# Patient Record
Sex: MALE | Race: WHITE | NOT HISPANIC OR LATINO | Employment: UNEMPLOYED | URBAN - METROPOLITAN AREA
[De-identification: names, ages, dates, MRNs, and addresses within clinical notes are randomized per-mention and may not be internally consistent; named-entity substitution may affect disease eponyms.]

---

## 2021-10-19 DIAGNOSIS — Q20.3 TRANSPOSITION GREAT ARTERIES: ICD-10-CM

## 2021-10-19 DIAGNOSIS — Q20.3 TRANSPOSITION OF THE GREAT ARTERIES: Primary | ICD-10-CM

## 2021-12-08 ENCOUNTER — HOSPITAL ENCOUNTER (OUTPATIENT)
Dept: CARDIOLOGY | Facility: CLINIC | Age: 41
Discharge: HOME | End: 2021-12-08
Payer: COMMERCIAL

## 2021-12-08 ENCOUNTER — HOSPITAL ENCOUNTER (OUTPATIENT)
Dept: CARDIOLOGY | Facility: HOSPITAL | Age: 41
Discharge: HOME | End: 2021-12-08
Attending: PEDIATRICS
Payer: COMMERCIAL

## 2021-12-08 ENCOUNTER — OFFICE VISIT (OUTPATIENT)
Dept: CARDIOLOGY | Facility: CLINIC | Age: 41
End: 2021-12-08
Payer: COMMERCIAL

## 2021-12-08 VITALS
OXYGEN SATURATION: 97 % | BODY MASS INDEX: 27.42 KG/M2 | WEIGHT: 191.56 LBS | HEIGHT: 69 IN | SYSTOLIC BLOOD PRESSURE: 134 MMHG | BODY MASS INDEX: 26.66 KG/M2 | HEIGHT: 70 IN | DIASTOLIC BLOOD PRESSURE: 78 MMHG | WEIGHT: 180 LBS | HEART RATE: 52 BPM

## 2021-12-08 DIAGNOSIS — Q20.3 TRANSPOSITION OF THE GREAT ARTERIES: ICD-10-CM

## 2021-12-08 DIAGNOSIS — Z87.74 H/O MUSTARD PROCEDURE: Primary | ICD-10-CM

## 2021-12-08 DIAGNOSIS — Z98.890 S/P MUSTARD OPERATION: ICD-10-CM

## 2021-12-08 DIAGNOSIS — Q24.9 ADULT CONGENITAL HEART DISEASE: ICD-10-CM

## 2021-12-08 DIAGNOSIS — Q20.3 D-TGA (DEXTRO-TRANSPOSITION OF GREAT ARTERIES): ICD-10-CM

## 2021-12-08 DIAGNOSIS — Q20.3 TRANSPOSITION GREAT ARTERIES: ICD-10-CM

## 2021-12-08 LAB
AV INDEX (PROSTH): 0.68
AV MEAN GRADIENT: 8 MMHG
AV PEAK GRADIENT: 12 MMHG
AV VELOCITY RATIO: 0.53
BSA FOR ECHO PROCEDURE: 1.99 M2
CV ECHO LV RWT: 0.45 CM
DOP CALC AO PEAK VEL: 1.71 M/S
DOP CALC AO VTI: 27.67 CM
DOP CALC LVOT PEAK VEL: 0.91 M/S
DOP CALC RVOT PEAK VEL: 1.28 M/S
DOP CALC RVOT VTI: 27.72 CM
DOP CALCLVOT PEAK VEL VTI: 18.93 CM
ECHO LV POSTERIOR WALL: 0.82 CM (ref 0.6–1.1)
FRACTIONAL SHORTENING: 41 % (ref 28–44)
INTERVENTRICULAR SEPTUM: 0.58 CM (ref 0.6–1.1)
LEFT ATRIUM SIZE: 3.42 CM
LEFT INTERNAL DIMENSION IN SYSTOLE: 2.17 CM (ref 2.1–4)
LEFT VENTRICLE DIASTOLIC VOLUME INDEX: 28.41 ML/M2
LEFT VENTRICLE DIASTOLIC VOLUME: 56.26 ML
LEFT VENTRICLE MASS INDEX: 34 G/M2
LEFT VENTRICLE SYSTOLIC VOLUME INDEX: 7.9 ML/M2
LEFT VENTRICLE SYSTOLIC VOLUME: 15.62 ML
LEFT VENTRICULAR INTERNAL DIMENSION IN DIASTOLE: 3.65 CM (ref 3.5–6)
LEFT VENTRICULAR MASS: 67.29 G
PV MEAN GRADIENT: 4.34 MMHG
PV PEAK VELOCITY: 1.35 CM/S
RIGHT VENTRICULAR END-DIASTOLIC DIMENSION: 4.86 CM

## 2021-12-08 PROCEDURE — 93303 ECHO TRANSTHORACIC: CPT | Mod: 26,,, | Performed by: PEDIATRICS

## 2021-12-08 PROCEDURE — 99999 PR PBB SHADOW E&M-EST. PATIENT-LVL III: ICD-10-PCS | Mod: PBBFAC,,, | Performed by: PEDIATRICS

## 2021-12-08 PROCEDURE — 93320 ECHO (CUPID ONLY): ICD-10-PCS | Mod: 26,,, | Performed by: PEDIATRICS

## 2021-12-08 PROCEDURE — 99205 OFFICE O/P NEW HI 60 MIN: CPT | Mod: 25,S$GLB,, | Performed by: PEDIATRICS

## 2021-12-08 PROCEDURE — 93325 ECHO (CUPID ONLY): ICD-10-PCS | Mod: 26,,, | Performed by: PEDIATRICS

## 2021-12-08 PROCEDURE — 93010 ELECTROCARDIOGRAM REPORT: CPT | Mod: S$GLB,,, | Performed by: INTERNAL MEDICINE

## 2021-12-08 PROCEDURE — 93005 EKG 12-LEAD: ICD-10-PCS | Mod: S$GLB,,, | Performed by: PEDIATRICS

## 2021-12-08 PROCEDURE — 93303 ECHO (CUPID ONLY): ICD-10-PCS | Mod: 26,,, | Performed by: PEDIATRICS

## 2021-12-08 PROCEDURE — 93320 DOPPLER ECHO COMPLETE: CPT | Mod: 26,,, | Performed by: PEDIATRICS

## 2021-12-08 PROCEDURE — 99999 PR PBB SHADOW E&M-EST. PATIENT-LVL III: CPT | Mod: PBBFAC,,, | Performed by: PEDIATRICS

## 2021-12-08 PROCEDURE — 93325 DOPPLER ECHO COLOR FLOW MAPG: CPT | Mod: 26,,, | Performed by: PEDIATRICS

## 2021-12-08 PROCEDURE — 93005 ELECTROCARDIOGRAM TRACING: CPT | Mod: S$GLB,,, | Performed by: PEDIATRICS

## 2021-12-08 PROCEDURE — 93010 EKG 12-LEAD: ICD-10-PCS | Mod: S$GLB,,, | Performed by: INTERNAL MEDICINE

## 2021-12-08 PROCEDURE — 99205 PR OFFICE/OUTPT VISIT, NEW, LEVL V, 60-74 MIN: ICD-10-PCS | Mod: 25,S$GLB,, | Performed by: PEDIATRICS

## 2021-12-08 PROCEDURE — 93303 ECHO TRANSTHORACIC: CPT

## 2021-12-08 RX ORDER — MULTIVITAMIN
1 TABLET ORAL DAILY
COMMUNITY

## 2021-12-08 RX ORDER — ASPIRIN 325 MG
325 TABLET, DELAYED RELEASE (ENTERIC COATED) ORAL
COMMUNITY

## 2021-12-10 ENCOUNTER — TELEPHONE (OUTPATIENT)
Dept: PEDIATRIC CARDIOLOGY | Facility: CLINIC | Age: 41
End: 2021-12-10
Payer: COMMERCIAL

## 2022-01-10 ENCOUNTER — TELEPHONE (OUTPATIENT)
Dept: PEDIATRIC CARDIOLOGY | Facility: CLINIC | Age: 42
End: 2022-01-10
Payer: COMMERCIAL

## 2022-01-10 NOTE — TELEPHONE ENCOUNTER
Spoke to Mr. Blackwood. Agreed to Cath/MAGALI on February 17th. Pre-procedural instructions given and pt started understanding. COVID swab instructions given as well.  Letter placed in mail to address on file. Dr. Betts updated at this time.

## 2022-01-10 NOTE — TELEPHONE ENCOUNTER
----- Message from Hernán Hernandez Jr., MD sent at 12/20/2021  1:25 PM CST -----  OK to schedule with GETA/MGAALI +/- baffle intervention  Chet    ----- Message -----  From: Kristie iH RN  Sent: 12/17/2021   3:56 PM CST  To: Hernán Hernandez Jr., MD, #    Please advise.   Thanks  Candice   ----- Message -----  From: Surinder Betts MD  Sent: 12/10/2021   1:34 PM CST  To: Leann Pompa MD, #    Could we get him set up for a catheterization with MAGALI?  Mustard with echo evidence of a baffle leak, worsening dyspnea on exertion, worsening polycythemic.  You guys plugged a baffle leak in the past (2014).  He has an old pacemaker in, and I can not get an MRI.    Patient would rather wait until January.  I told him that would be fine with us.

## 2022-02-17 ENCOUNTER — ANESTHESIA EVENT (OUTPATIENT)
Dept: MEDSURG UNIT | Facility: HOSPITAL | Age: 42
End: 2022-02-17
Payer: COMMERCIAL

## 2022-02-17 ENCOUNTER — HOSPITAL ENCOUNTER (OUTPATIENT)
Facility: HOSPITAL | Age: 42
Discharge: HOME | End: 2022-02-17
Attending: PEDIATRICS | Admitting: PEDIATRICS
Payer: COMMERCIAL

## 2022-02-17 ENCOUNTER — ANESTHESIA (OUTPATIENT)
Dept: MEDSURG UNIT | Facility: HOSPITAL | Age: 42
End: 2022-02-17
Payer: COMMERCIAL

## 2022-02-17 VITALS
HEART RATE: 48 BPM | WEIGHT: 190 LBS | HEIGHT: 69 IN | RESPIRATION RATE: 18 BRPM | TEMPERATURE: 97 F | OXYGEN SATURATION: 97 % | SYSTOLIC BLOOD PRESSURE: 111 MMHG | DIASTOLIC BLOOD PRESSURE: 56 MMHG | BODY MASS INDEX: 28.14 KG/M2

## 2022-02-17 DIAGNOSIS — Q20.3 D-TGA (DEXTRO-TRANSPOSITION OF GREAT ARTERIES): ICD-10-CM

## 2022-02-17 DIAGNOSIS — Z98.890 S/P MUSTARD OPERATION: ICD-10-CM

## 2022-02-17 DIAGNOSIS — I47.20 VT (VENTRICULAR TACHYCARDIA): ICD-10-CM

## 2022-02-17 DIAGNOSIS — Z95.810 ICD (IMPLANTABLE CARDIOVERTER-DEFIBRILLATOR) IN PLACE: ICD-10-CM

## 2022-02-17 DIAGNOSIS — Q20.3 TGA (TRANSPOSITION OF GREAT ARTERIES): ICD-10-CM

## 2022-02-17 DIAGNOSIS — Q24.9 ADULT CONGENITAL HEART DISEASE: Primary | ICD-10-CM

## 2022-02-17 LAB
ABO + RH BLD: NORMAL
BASOPHILS # BLD AUTO: 0.07 K/UL (ref 0–0.2)
BASOPHILS NFR BLD: 1.1 % (ref 0–1.9)
BLD GP AB SCN CELLS X3 SERPL QL: NORMAL
DIFFERENTIAL METHOD: ABNORMAL
EOSINOPHIL # BLD AUTO: 0.7 K/UL (ref 0–0.5)
EOSINOPHIL NFR BLD: 11 % (ref 0–8)
ERYTHROCYTE [DISTWIDTH] IN BLOOD BY AUTOMATED COUNT: 11.9 % (ref 11.5–14.5)
HCT VFR BLD AUTO: 49.1 % (ref 40–54)
HGB BLD-MCNC: 16.9 G/DL (ref 14–18)
IMM GRANULOCYTES # BLD AUTO: 0.02 K/UL (ref 0–0.04)
IMM GRANULOCYTES NFR BLD AUTO: 0.3 % (ref 0–0.5)
LYMPHOCYTES # BLD AUTO: 1.9 K/UL (ref 1–4.8)
LYMPHOCYTES NFR BLD: 30.3 % (ref 18–48)
MCH RBC QN AUTO: 30.8 PG (ref 27–31)
MCHC RBC AUTO-ENTMCNC: 34.4 G/DL (ref 32–36)
MCV RBC AUTO: 89 FL (ref 82–98)
MONOCYTES # BLD AUTO: 0.6 K/UL (ref 0.3–1)
MONOCYTES NFR BLD: 9.9 % (ref 4–15)
NEUTROPHILS # BLD AUTO: 3 K/UL (ref 1.8–7.7)
NEUTROPHILS NFR BLD: 47.4 % (ref 38–73)
NRBC BLD-RTO: 0 /100 WBC
PLATELET # BLD AUTO: 150 K/UL (ref 150–450)
PMV BLD AUTO: 11.3 FL (ref 9.2–12.9)
RBC # BLD AUTO: 5.49 M/UL (ref 4.6–6.2)
WBC # BLD AUTO: 6.36 K/UL (ref 3.9–12.7)

## 2022-02-17 PROCEDURE — 37000009 HC ANESTHESIA EA ADD 15 MINS: Performed by: PEDIATRICS

## 2022-02-17 PROCEDURE — 82330 ASSAY OF CALCIUM: CPT | Performed by: PEDIATRICS

## 2022-02-17 PROCEDURE — 82947 ASSAY GLUCOSE BLOOD QUANT: CPT | Performed by: PEDIATRICS

## 2022-02-17 PROCEDURE — 84132 ASSAY OF SERUM POTASSIUM: CPT | Performed by: PEDIATRICS

## 2022-02-17 PROCEDURE — C1725 CATH, TRANSLUMIN NON-LASER: HCPCS | Performed by: PEDIATRICS

## 2022-02-17 PROCEDURE — 37000008 HC ANESTHESIA 1ST 15 MINUTES: Performed by: PEDIATRICS

## 2022-02-17 PROCEDURE — 75827 VEIN X-RAY CHEST: CPT | Mod: 26,59,, | Performed by: PEDIATRICS

## 2022-02-17 PROCEDURE — 93566 PR INJECT SELECT RIGHT VENT/ATRIAL ANGIO DURING HEART CATH: ICD-10-PCS | Mod: ,,, | Performed by: PEDIATRICS

## 2022-02-17 PROCEDURE — D9220A PRA ANESTHESIA: Mod: ANES,,, | Performed by: STUDENT IN AN ORGANIZED HEALTH CARE EDUCATION/TRAINING PROGRAM

## 2022-02-17 PROCEDURE — C1769 GUIDE WIRE: HCPCS | Performed by: PEDIATRICS

## 2022-02-17 PROCEDURE — 83605 ASSAY OF LACTIC ACID: CPT | Performed by: PEDIATRICS

## 2022-02-17 PROCEDURE — 85025 COMPLETE CBC W/AUTO DIFF WBC: CPT | Performed by: PEDIATRICS

## 2022-02-17 PROCEDURE — 85347 COAGULATION TIME ACTIVATED: CPT | Performed by: PEDIATRICS

## 2022-02-17 PROCEDURE — 63600175 PHARM REV CODE 636 W HCPCS: Performed by: PEDIATRICS

## 2022-02-17 PROCEDURE — 93597 R&L HRT CATH CHD ABNL NT CNJ: CPT | Mod: 26,22,, | Performed by: PEDIATRICS

## 2022-02-17 PROCEDURE — 25000003 PHARM REV CODE 250: Performed by: PEDIATRICS

## 2022-02-17 PROCEDURE — D9220A PRA ANESTHESIA: Mod: CRNA,,, | Performed by: NURSE ANESTHETIST, CERTIFIED REGISTERED

## 2022-02-17 PROCEDURE — C1894 INTRO/SHEATH, NON-LASER: HCPCS | Performed by: PEDIATRICS

## 2022-02-17 PROCEDURE — 25000003 PHARM REV CODE 250: Performed by: NURSE ANESTHETIST, CERTIFIED REGISTERED

## 2022-02-17 PROCEDURE — 25500020 PHARM REV CODE 255: Performed by: PEDIATRICS

## 2022-02-17 PROCEDURE — 63600175 PHARM REV CODE 636 W HCPCS: Performed by: NURSE ANESTHETIST, CERTIFIED REGISTERED

## 2022-02-17 PROCEDURE — 93566 NJX CAR CTH SLCTV RV/RA ANG: CPT | Mod: ,,, | Performed by: PEDIATRICS

## 2022-02-17 PROCEDURE — 93317 ECHO TRANSESOPHAGEAL: CPT | Performed by: PEDIATRICS

## 2022-02-17 PROCEDURE — 86901 BLOOD TYPING SEROLOGIC RH(D): CPT | Performed by: PEDIATRICS

## 2022-02-17 PROCEDURE — 93566 NJX CAR CTH SLCTV RV/RA ANG: CPT | Performed by: PEDIATRICS

## 2022-02-17 PROCEDURE — 93325 DOPPLER ECHO COLOR FLOW MAPG: CPT | Performed by: PEDIATRICS

## 2022-02-17 PROCEDURE — 86900 BLOOD TYPING SEROLOGIC ABO: CPT | Performed by: PEDIATRICS

## 2022-02-17 PROCEDURE — D9220A PRA ANESTHESIA: ICD-10-PCS | Mod: CRNA,,, | Performed by: NURSE ANESTHETIST, CERTIFIED REGISTERED

## 2022-02-17 PROCEDURE — 75827 PR  VENOGRAM SUPER VENA CAVA: ICD-10-PCS | Mod: 26,59,, | Performed by: PEDIATRICS

## 2022-02-17 PROCEDURE — 93597 R&L HRT CATH CHD ABNL NT CNJ: CPT | Mod: 22,82,, | Performed by: PEDIATRICS

## 2022-02-17 PROCEDURE — 37248 TRLUML BALO ANGIOP 1ST VEIN: CPT | Mod: 22,,, | Performed by: PEDIATRICS

## 2022-02-17 PROCEDURE — 93320 DOPPLER ECHO COMPLETE: CPT | Performed by: PEDIATRICS

## 2022-02-17 PROCEDURE — D9220A PRA ANESTHESIA: ICD-10-PCS | Mod: ANES,,, | Performed by: STUDENT IN AN ORGANIZED HEALTH CARE EDUCATION/TRAINING PROGRAM

## 2022-02-17 PROCEDURE — C1751 CATH, INF, PER/CENT/MIDLINE: HCPCS | Performed by: PEDIATRICS

## 2022-02-17 PROCEDURE — 37248 TRLUML BALO ANGIOP 1ST VEIN: CPT | Performed by: PEDIATRICS

## 2022-02-17 PROCEDURE — 82805 BLOOD GASES W/O2 SATURATION: CPT | Performed by: PEDIATRICS

## 2022-02-17 PROCEDURE — 93597 R&L HRT CATH CHD ABNL NT CNJ: CPT | Performed by: PEDIATRICS

## 2022-02-17 PROCEDURE — 75827 VEIN X-RAY CHEST: CPT | Mod: 59 | Performed by: PEDIATRICS

## 2022-02-17 PROCEDURE — 27201423 OPTIME MED/SURG SUP & DEVICES STERILE SUPPLY: Performed by: PEDIATRICS

## 2022-02-17 PROCEDURE — 93597 PR RT & LT HEART CATH W/IMG GUID, ABNORMAL NATIVE CONNECT: ICD-10-PCS | Mod: 26,22,, | Performed by: PEDIATRICS

## 2022-02-17 PROCEDURE — 93597 PR RT & LT HEART CATH W/IMG GUID, ABNORMAL NATIVE CONNECT: ICD-10-PCS | Mod: 22,82,, | Performed by: PEDIATRICS

## 2022-02-17 PROCEDURE — 37248 PR TRANSLML BALLOON ANGIO, OPEN/PERC, INITIAL VEIN: ICD-10-PCS | Mod: 22,,, | Performed by: PEDIATRICS

## 2022-02-17 RX ORDER — ONDANSETRON 2 MG/ML
INJECTION INTRAMUSCULAR; INTRAVENOUS
Status: DISCONTINUED | OUTPATIENT
Start: 2022-02-17 | End: 2022-02-17

## 2022-02-17 RX ORDER — ONDANSETRON 2 MG/ML
4 INJECTION INTRAMUSCULAR; INTRAVENOUS DAILY PRN
Status: CANCELLED | OUTPATIENT
Start: 2022-02-17

## 2022-02-17 RX ORDER — FENTANYL CITRATE 50 UG/ML
INJECTION, SOLUTION INTRAMUSCULAR; INTRAVENOUS
Status: DISCONTINUED | OUTPATIENT
Start: 2022-02-17 | End: 2022-02-17

## 2022-02-17 RX ORDER — PROPOFOL 10 MG/ML
VIAL (ML) INTRAVENOUS
Status: DISCONTINUED | OUTPATIENT
Start: 2022-02-17 | End: 2022-02-17

## 2022-02-17 RX ORDER — SODIUM CHLORIDE 0.9 % (FLUSH) 0.9 %
10 SYRINGE (ML) INJECTION
Status: DISCONTINUED | OUTPATIENT
Start: 2022-02-17 | End: 2022-02-17 | Stop reason: HOSPADM

## 2022-02-17 RX ORDER — ONDANSETRON 2 MG/ML
4 INJECTION INTRAMUSCULAR; INTRAVENOUS EVERY 12 HOURS PRN
Status: DISCONTINUED | OUTPATIENT
Start: 2022-02-17 | End: 2022-02-17 | Stop reason: HOSPADM

## 2022-02-17 RX ORDER — NEOSTIGMINE METHYLSULFATE 0.5 MG/ML
INJECTION, SOLUTION INTRAVENOUS
Status: DISCONTINUED | OUTPATIENT
Start: 2022-02-17 | End: 2022-02-17

## 2022-02-17 RX ORDER — LIDOCAINE HYDROCHLORIDE 20 MG/ML
INJECTION, SOLUTION EPIDURAL; INFILTRATION; INTRACAUDAL; PERINEURAL
Status: DISCONTINUED | OUTPATIENT
Start: 2022-02-17 | End: 2022-02-17

## 2022-02-17 RX ORDER — CEFAZOLIN SODIUM 1 G/50ML
SOLUTION INTRAVENOUS
Status: DISCONTINUED | OUTPATIENT
Start: 2022-02-17 | End: 2022-02-17

## 2022-02-17 RX ORDER — ACETAMINOPHEN 325 MG/1
650 TABLET ORAL EVERY 4 HOURS PRN
Status: DISCONTINUED | OUTPATIENT
Start: 2022-02-17 | End: 2022-02-17 | Stop reason: HOSPADM

## 2022-02-17 RX ORDER — HYDROMORPHONE HYDROCHLORIDE 1 MG/ML
0.2 INJECTION, SOLUTION INTRAMUSCULAR; INTRAVENOUS; SUBCUTANEOUS EVERY 5 MIN PRN
Status: CANCELLED | OUTPATIENT
Start: 2022-02-17

## 2022-02-17 RX ORDER — HEPARIN SODIUM 1000 [USP'U]/ML
INJECTION, SOLUTION INTRAVENOUS; SUBCUTANEOUS
Status: DISCONTINUED | OUTPATIENT
Start: 2022-02-17 | End: 2022-02-17

## 2022-02-17 RX ORDER — FENTANYL CITRATE 50 UG/ML
25 INJECTION, SOLUTION INTRAMUSCULAR; INTRAVENOUS EVERY 5 MIN PRN
Status: CANCELLED | OUTPATIENT
Start: 2022-02-17

## 2022-02-17 RX ORDER — MIDAZOLAM HYDROCHLORIDE 1 MG/ML
INJECTION, SOLUTION INTRAMUSCULAR; INTRAVENOUS
Status: DISCONTINUED | OUTPATIENT
Start: 2022-02-17 | End: 2022-02-17

## 2022-02-17 RX ORDER — ROCURONIUM BROMIDE 10 MG/ML
INJECTION, SOLUTION INTRAVENOUS
Status: DISCONTINUED | OUTPATIENT
Start: 2022-02-17 | End: 2022-02-17

## 2022-02-17 RX ORDER — DEXAMETHASONE SODIUM PHOSPHATE 4 MG/ML
INJECTION, SOLUTION INTRA-ARTICULAR; INTRALESIONAL; INTRAMUSCULAR; INTRAVENOUS; SOFT TISSUE
Status: DISCONTINUED | OUTPATIENT
Start: 2022-02-17 | End: 2022-02-17

## 2022-02-17 RX ORDER — MORPHINE SULFATE 2 MG/ML
2 INJECTION, SOLUTION INTRAMUSCULAR; INTRAVENOUS
Status: DISCONTINUED | OUTPATIENT
Start: 2022-02-17 | End: 2022-02-17 | Stop reason: HOSPADM

## 2022-02-17 RX ADMIN — NEOSTIGMINE METHYLSULFATE 4 MG: 0.5 INJECTION INTRAVENOUS at 10:02

## 2022-02-17 RX ADMIN — ROCURONIUM BROMIDE 10 MG: 10 INJECTION, SOLUTION INTRAVENOUS at 08:02

## 2022-02-17 RX ADMIN — ACETAMINOPHEN 650 MG: 325 TABLET ORAL at 11:02

## 2022-02-17 RX ADMIN — SODIUM CHLORIDE, SODIUM GLUCONATE, SODIUM ACETATE, POTASSIUM CHLORIDE, MAGNESIUM CHLORIDE, SODIUM PHOSPHATE, DIBASIC, AND POTASSIUM PHOSPHATE: .53; .5; .37; .037; .03; .012; .00082 INJECTION, SOLUTION INTRAVENOUS at 07:02

## 2022-02-17 RX ADMIN — CEFAZOLIN SODIUM 2 G: 1 SOLUTION INTRAVENOUS at 09:02

## 2022-02-17 RX ADMIN — ROCURONIUM BROMIDE 10 MG: 10 INJECTION, SOLUTION INTRAVENOUS at 07:02

## 2022-02-17 RX ADMIN — PROPOFOL 50 MG: 10 INJECTION, EMULSION INTRAVENOUS at 08:02

## 2022-02-17 RX ADMIN — ONDANSETRON 4 MG: 2 INJECTION INTRAMUSCULAR; INTRAVENOUS at 09:02

## 2022-02-17 RX ADMIN — MIDAZOLAM 2 MG: 1 INJECTION INTRAMUSCULAR; INTRAVENOUS at 08:02

## 2022-02-17 RX ADMIN — GLYCOPYRROLATE 0.4 MG: 0.2 INJECTION, SOLUTION INTRAMUSCULAR; INTRAVENOUS at 10:02

## 2022-02-17 RX ADMIN — ROCURONIUM BROMIDE 10 MG: 10 INJECTION, SOLUTION INTRAVENOUS at 09:02

## 2022-02-17 RX ADMIN — MIDAZOLAM 2 MG: 1 INJECTION INTRAMUSCULAR; INTRAVENOUS at 07:02

## 2022-02-17 RX ADMIN — HEPARIN SODIUM 5000 UNITS: 1000 INJECTION, SOLUTION INTRAVENOUS; SUBCUTANEOUS at 08:02

## 2022-02-17 RX ADMIN — ROCURONIUM BROMIDE 50 MG: 10 INJECTION, SOLUTION INTRAVENOUS at 08:02

## 2022-02-17 RX ADMIN — FENTANYL CITRATE 100 MCG: 50 INJECTION INTRAMUSCULAR; INTRAVENOUS at 08:02

## 2022-02-17 RX ADMIN — DEXAMETHASONE SODIUM PHOSPHATE 4 MG: 4 INJECTION INTRA-ARTICULAR; INTRALESIONAL; INTRAMUSCULAR; INTRAVENOUS; SOFT TISSUE at 09:02

## 2022-02-17 RX ADMIN — LIDOCAINE HYDROCHLORIDE 100 MG: 20 INJECTION, SOLUTION EPIDURAL; INFILTRATION; INTRACAUDAL at 08:02

## 2022-02-17 RX ADMIN — MORPHINE SULFATE 2 MG: 2 INJECTION, SOLUTION INTRAMUSCULAR; INTRAVENOUS at 12:02

## 2022-02-17 NOTE — ASSESSMENT & PLAN NOTE
1 yr old male with D-TGA s/p Mustard with history of baffle leak device closure now with exercise intolerance.    Plan:  To cath lab for right/left heart cath and MAGALI

## 2022-02-17 NOTE — ANESTHESIA POSTPROCEDURE EVALUATION
Anesthesia Post Evaluation    Patient: Yazan Blackwood    Procedure(s) Performed: Procedure(s) (LRB):  CATHETERIZATION, HEART, COMBINED RIGHT AND RETROGRADE LEFT, FOR CONGENITAL HEART DEFECT (N/A)  Transesophageal echo (MAGALI) intra-procedure log documentation  Venogram  PTA, Superior Vena Cava    Final Anesthesia Type: general      Patient location during evaluation: PACU  Patient participation: Yes- Able to Participate  Level of consciousness: awake and alert  Post-procedure vital signs: reviewed and stable  Pain management: adequate  Airway patency: patent  SUMAN mitigation strategies: Extubation while patient is awake  PONV status at discharge: No PONV  Anesthetic complications: no      Cardiovascular status: stable  Respiratory status: spontaneous ventilation and face mask  Hydration status: euvolemic  Follow-up not needed.          Vitals Value Taken Time   /56 02/17/22 1300   Temp 35.9 °C (96.6 °F) 02/17/22 1200   Pulse 48 02/17/22 1300   Resp 18 02/17/22 1300   SpO2 97 % 02/17/22 1200         Event Time   Out of Recovery 11:54:00         Pain/Luis Eduardo Score: Pain Rating Prior to Med Admin: 7 (2/17/2022 12:36 PM)  Luis Eduardo Score: 10 (2/17/2022  1:30 PM)

## 2022-02-17 NOTE — H&P
[facility] - Short Stay Cardiac Unit  Pediatric Cardiology  History and Physical     Patient Name: Yazan Blackwood  MRN: [id number]  Admission Date: 2/17/2022  Code Status: No Order   Attending Provider: Leann Pompa MD   Primary Cardiologist: Dr. Betts  Primary Care Physician: Bonifacio Wyatt MD  Principal Problem:<principal problem not specified>    Subjective:     Chief Complaint:  D-TGA s/p atrial switch     HPI:  41 yr old male with D-TGA s/p Mustard with history of baffle leak device closure now with exercise intolerance.      Past Medical History:   Diagnosis Date    ADHD (attention deficit hyperactivity disorder)     ADHD (attention deficit hyperactivity disorder)     Asthma     CHF (congestive heart failure)     Encounter for blood transfusion     Hypertension     Migraines     Seizures     Stroke 2004    rigth sided MCA       Past Surgical History:   Procedure Laterality Date    baffle leak  8/04    closed with an 18mm Amplatzer by [place]. Burton at [place]     CARDIAC SURGERY      ICD placement      sick sinus syndrome    ROTATOR CUFF REPAIR      tga repair- mustard  1980    [facility] at [facility]   [facility]      VASCULAR SURGERY         Review of patient's allergies indicates:  No Known Allergies    No current facility-administered medications on file prior to encounter.     Current Outpatient Medications on File Prior to Encounter   Medication Sig    aspirin (ECOTRIN) 325 MG EC tablet Take 325 mg by mouth.    multivitamin (THERAGRAN) per tablet Take 1 tablet by mouth once daily.    zonisamide (ZONEGRAN) 100 MG Cap Take 200 mg by mouth every evening.    aspirin 81 MG Chew Take 81 mg by mouth once daily.     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     Review of Systems   All other systems reviewed and are negative.    Objective:     Vital Signs (Most Recent):  Temp: 98.1 °F (36.7 °C) (02/17/22 0647)  Pulse:  (!) 44 (02/17/22 0647)  Resp: 16 (02/17/22 0647)  BP: 135/74 (02/17/22 0648)  SpO2: 98 % (02/17/22 0647) Vital Signs (24h Range):  Temp:  [98.1 °F (36.7 °C)] 98.1 °F (36.7 °C)  Pulse:  [44] 44  Resp:  [16] 16  SpO2:  [98 %] 98 %  BP: (135-140)/(69-74) 135/74     Weight: 86.2 kg (190 lb)  Body mass index is 28.06 kg/m².    SpO2: 98 %  O2 Device (Oxygen Therapy): room air    No intake or output data in the 24 hours ending 02/17/22 0736    Lines/Drains/Airways     Peripheral Intravenous Line                 Peripheral IV - Single Lumen 02/17/22 0654 18 G Left;Posterior Wrist <1 day                Physical Exam  Constitutional:       Appearance: Normal appearance.   HENT:      Head: Normocephalic.      Nose: Nose normal.   Eyes:      Pupils: Pupils are equal, round, and reactive to light.   Cardiovascular:      Rate and Rhythm: Normal rate.   Pulmonary:      Effort: Pulmonary effort is normal.   Musculoskeletal:         General: Normal range of motion.      Cervical back: Normal range of motion.   Skin:     General: Skin is warm.      Capillary Refill: Capillary refill takes less than 2 seconds.   Neurological:      General: No focal deficit present.      Mental Status: He is alert.   Psychiatric:         Mood and Affect: Mood normal.         Significant Labs:   BMP:   Glucose (mg/dL)   Date/Time Value Status   12/08/2021 04:09 PM 86 Final     Sodium (mmol/L)   Date/Time Value Status   12/08/2021 04:09  Final     Potassium (mmol/L)   Date/Time Value Status   12/08/2021 04:09 PM 4.0 Final     Chloride (mmol/L)   Date/Time Value Status   12/08/2021 04:09  (H) Final     CO2 (mmol/L)   Date/Time Value Status   12/08/2021 04:09 PM 22 (L) Final     BUN (mg/dL)   Date/Time Value Status   12/08/2021 04:09 PM 10 Final     Creatinine (mg/dL)   Date/Time Value Status   12/08/2021 04:09 PM 1.0 Final     Calcium (mg/dL)   Date/Time Value Status   12/08/2021 04:09 PM 9.7 Final     Magnesium (mg/dL)   Date/Time Value  Status   12/08/2021 04:09 PM 2.1 Final    and CBC   WBC (K/uL)   Date/Time Value Status   12/08/2021 04:09 PM 8.23 Final     Hemoglobin (g/dL)   Date/Time Value Status   12/08/2021 04:09 PM 18.2 (H) Final     POC Hematocrit (%PCV)   Date/Time Value Status   02/25/2014 10:49 AM 41 Final     Hematocrit (%)   Date/Time Value Status   12/08/2021 04:09 PM 52.0 Final     MCV (fL)   Date/Time Value Status   12/08/2021 04:09 PM 89 Final     Platelets (K/uL)   Date/Time Value Status   12/08/2021 04:09  Final       Significant Imaging: None    Assessment and Plan:     Other  S/P Mustard operation  1 yr old male with D-TGA s/p Mustard with history of baffle leak device closure now with exercise intolerance.    Plan:  To cath lab for right/left heart cath and MAGALI          Leann Pompa MD  Pediatric Cardiology   [facility] - Short Stay Cardiac Unit

## 2022-02-17 NOTE — PLAN OF CARE
Pt transferred from recovery via stretcher.  Vss.  r groin site remains cdi.  0 bleed, 0 hematoma.  Post op orders and assessment initiated.  Pt aao, tolerating po.  Call bell within reach.  Family called to bs.  Will continue to monitor.   
Received pt to floor from home accompanied by sister.  AAO x 4. Denies pain or discomfort. Respirations even and unlabored. No distress noted. Pt stable.  Admit assessment complete. IV x 1 placed.  Pt oriented to room and call bell placed within reach.  Will continue to monitor.  
normal balance

## 2022-02-17 NOTE — HPI
41 yr old male with D-TGA s/p Mustard with history of baffle leak device closure now with exercise intolerance.

## 2022-02-17 NOTE — Clinical Note
The catheter was repositioned into the right atrium. An angiography was performed of the Right Atrium.

## 2022-02-17 NOTE — NURSING
IV d/c'd with cath tip intact.  Pt and pt sister verbalized an understanding of d/c instructions.  Ambulated around unit without difficulty.  Voided.  Right groin gauze/tegaderm dressing remains clean dry and intact.  Right groin soft.  No bleeding or hematoma noted.  Off unit via wheelchair for discharge home.  Pt sister at his side.

## 2022-02-17 NOTE — Clinical Note
The catheter was inserted into the superior vena cava. An angiography was performed of the SVC. The angiography was performed via power injection. The injected amount was 30 mL contrast at 20 mL/s. The PSI from the power injection was 900.

## 2022-02-17 NOTE — Clinical Note
The PA catheter was inserted into the left pulmonary artery. Hemodynamics were performed. O2 saturation was measured at 71%.

## 2022-02-17 NOTE — SUBJECTIVE & OBJECTIVE
Past Medical History:   Diagnosis Date    ADHD (attention deficit hyperactivity disorder)     ADHD (attention deficit hyperactivity disorder)     Asthma     CHF (congestive heart failure)     Encounter for blood transfusion     Hypertension     Migraines     Seizures     Stroke 2004    rigth sided MCA       Past Surgical History:   Procedure Laterality Date    baffle leak  8/04    closed with an 18mm Amplatzer by Dr. Burton at [place]     CARDIAC SURGERY      ICD placement      sick sinus syndrome    ROTATOR CUFF REPAIR      tga repair- mustard  1980    [facility]   [facility]      VASCULAR SURGERY         Review of patient's allergies indicates:  No Known Allergies    No current facility-administered medications on file prior to encounter.     Current Outpatient Medications on File Prior to Encounter   Medication Sig    aspirin (ECOTRIN) 325 MG EC tablet Take 325 mg by mouth.    multivitamin (THERAGRAN) per tablet Take 1 tablet by mouth once daily.    zonisamide (ZONEGRAN) 100 MG Cap Take 200 mg by mouth every evening.    aspirin 81 MG Chew Take 81 mg by mouth once daily.     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     Review of Systems   All other systems reviewed and are negative.    Objective:     Vital Signs (Most Recent):  Temp: 98.1 °F (36.7 °C) (02/17/22 0647)  Pulse: (!) 44 (02/17/22 0647)  Resp: 16 (02/17/22 0647)  BP: 135/74 (02/17/22 0648)  SpO2: 98 % (02/17/22 0647) Vital Signs (24h Range):  Temp:  [98.1 °F (36.7 °C)] 98.1 °F (36.7 °C)  Pulse:  [44] 44  Resp:  [16] 16  SpO2:  [98 %] 98 %  BP: (135-140)/(69-74) 135/74     Weight: 86.2 kg (190 lb)  Body mass index is 28.06 kg/m².    SpO2: 98 %  O2 Device (Oxygen Therapy): room air    No intake or output data in the 24 hours ending 02/17/22 0736    Lines/Drains/Airways     Peripheral Intravenous Line                 Peripheral IV - Single Lumen 02/17/22 0654 18  G Left;Posterior Wrist <1 day                Physical Exam  Constitutional:       Appearance: Normal appearance.   HENT:      Head: Normocephalic.      Nose: Nose normal.   Eyes:      Pupils: Pupils are equal, round, and reactive to light.   Cardiovascular:      Rate and Rhythm: Normal rate.   Pulmonary:      Effort: Pulmonary effort is normal.   Musculoskeletal:         General: Normal range of motion.      Cervical back: Normal range of motion.   Skin:     General: Skin is warm.      Capillary Refill: Capillary refill takes less than 2 seconds.   Neurological:      General: No focal deficit present.      Mental Status: He is alert.   Psychiatric:         Mood and Affect: Mood normal.         Significant Labs:   BMP:   Glucose (mg/dL)   Date/Time Value Status   12/08/2021 04:09 PM 86 Final     Sodium (mmol/L)   Date/Time Value Status   12/08/2021 04:09  Final     Potassium (mmol/L)   Date/Time Value Status   12/08/2021 04:09 PM 4.0 Final     Chloride (mmol/L)   Date/Time Value Status   12/08/2021 04:09  (H) Final     CO2 (mmol/L)   Date/Time Value Status   12/08/2021 04:09 PM 22 (L) Final     BUN (mg/dL)   Date/Time Value Status   12/08/2021 04:09 PM 10 Final     Creatinine (mg/dL)   Date/Time Value Status   12/08/2021 04:09 PM 1.0 Final     Calcium (mg/dL)   Date/Time Value Status   12/08/2021 04:09 PM 9.7 Final     Magnesium (mg/dL)   Date/Time Value Status   12/08/2021 04:09 PM 2.1 Final    and CBC   WBC (K/uL)   Date/Time Value Status   12/08/2021 04:09 PM 8.23 Final     Hemoglobin (g/dL)   Date/Time Value Status   12/08/2021 04:09 PM 18.2 (H) Final     POC Hematocrit (%PCV)   Date/Time Value Status   02/25/2014 10:49 AM 41 Final     Hematocrit (%)   Date/Time Value Status   12/08/2021 04:09 PM 52.0 Final     MCV (fL)   Date/Time Value Status   12/08/2021 04:09 PM 89 Final     Platelets (K/uL)   Date/Time Value Status   12/08/2021 04:09  Final       Significant Imaging: None

## 2022-02-17 NOTE — Clinical Note
99 ml of contrast were injected throughout the case. 201 mL of contrast was the total wasted during the case. 300 mL was the total amount used during the case.

## 2022-02-17 NOTE — Clinical Note
The PA catheter was repositioned to the right atrium. Hemodynamics were performed. O2 saturation was measured at 75%.

## 2022-02-17 NOTE — NURSING
Report from CATALINO Flowers.  Right groin dressing clean dry and intact.  Right groin soft.  No bleeding or hematoma noted.  No c/o pain at present.  Pt sister at his side.  Will continue to monitor.

## 2022-02-17 NOTE — PROGRESS NOTES
Patient admitted to recovery see [facility] for complete assessment pacu bcg's maintained safety measures verified patient instructed on pain scale and patient verbalized understanding. Tried to call patient's sister () but went to voicemail will try again later.

## 2022-02-17 NOTE — NURSING TRANSFER
Nursing Transfer Note      2/17/2022     Reason patient is being transferred: d/c criteria met    Transfer To: [facility]    Transfer via stretcher    Transfer with cardiac monitoringand called and registered patient on box   Transported by anjali castellon     Medicines sent: none    Any special needs or follow-up needed: right groin checks    Chart send with patient: Yes    Notified: reported to arturo castellon     Patient reassessed at: see epic  (date, time)    Upon arrival to floor: to room no complaints no distress noted.

## 2022-02-17 NOTE — TRANSFER OF CARE
"Anesthesia Transfer of Care Note    Patient: Yazan Blackwood    Procedure(s) Performed: Procedure(s) (LRB):  CATHETERIZATION, HEART, COMBINED RIGHT AND RETROGRADE LEFT, FOR CONGENITAL HEART DEFECT (N/A)  Transesophageal echo (MAGALI) intra-procedure log documentation  Venogram  PTA, Superior Vena Cava    Patient location: PACU    Anesthesia Type: general    Transport from OR: Transported from OR on 6-10 L/min O2 by face mask with adequate spontaneous ventilation    Post pain: adequate analgesia    Post assessment: tolerated procedure well and no apparent anesthetic complications    Post vital signs: stable    Level of consciousness: awake, alert and oriented    Nausea/Vomiting: no nausea/vomiting    Complications: none    Transfer of care protocol was followed      Last vitals:   Visit Vitals  /74 (BP Location: Right arm, Patient Position: Lying)   Pulse (!) 44   Temp 36.7 °C (98.1 °F) (Temporal)   Resp 16   Ht 5' 9" (1.753 m)   Wt 86.2 kg (190 lb)   SpO2 98%   BMI 28.06 kg/m²     "

## 2022-02-17 NOTE — Clinical Note
The catheter was inserted into the and was repositioned into the superior vena cava. An angiography was performed of the SVC. The angiography was performed via power injection. The injected amount was 30 mL contrast at 20 mL/s. The PSI from the power injection was 900.

## 2022-02-17 NOTE — Clinical Note
An angiography was performed of the SVC. Through balloon 14  X 2 balloon [Awake] : awake [Alert] : alert [Soft] : soft [Oriented x3] : oriented to person, place, and time [Normal] : uterus [Labia Majora] : labia major [Labia Minora] : labia minora [IUD String] : had an IUD string protruding out [Pap Obtained] : a Pap smear was performed [No Tenderness] : no rectal tenderness [Acute Distress] : no acute distress [Mass] : no breast mass [Nipple Discharge] : no nipple discharge [Axillary LAD] : no axillary lymphadenopathy [Tender] : non tender [Distended] : not distended [Depressed Mood] : not depressed [Flat Affect] : affect not flat [Occult Blood] : occult blood test from digital rectal exam was negative

## 2022-02-17 NOTE — ANESTHESIA PREPROCEDURE EVALUATION
02/17/2022  Yazan Blackwood is a 41 y.o., male c ACHDz. D-TGA s/p BAS s/p mustard c good systemic RV function, pulmonary baffle leak s/p 18mm amplatzer closure, CVA, SSS & VT s/p DC-ICD (2006) s/p multiple lead revisions, Sz, and baseline JOHNSON and SOB    12/08/21 TTE  CONGENITAL CARDIAC HISTORY:  d-transposition of the great vessels   S/P Mustard procedure - Andreas at [facility].   S/P Pacer/AICD - sick sinus syndrome and history of syncope.   S/P 18 mm Amplatzer device for baffle leak following stroke: [facility]'s - 2004   S/P 6mm -2 in small superior limb baffle leak - 2/25/2014     SEGMENTAL CARDIAC CONNECTIONS:  Abdominal situs solitus.    Atrial baffle present consistent with atrial switch operation.  Discordant systemic and pulmonary venous return to ventricles.  Grossly normal tricuspid and mitral valves noted.    Right ventricle dilation and hypertrophy.    Ventriculoarterial alignment discordant.    Morphology is consistent with d-loop.    Levocardia  The ventricular septum appears intact.         IMPRESION:   Difficult acoustic windows-  Turbulence noted most consistent with pulmonary venous to systemic shunt with images suggesting proximity to the Amplatzer device (clips 50, 62,69 & 73).   Lead passes mitral valve to subpulmonary left ventricle with no obvious stenosis of the superior limb of the baffle and trivial to mild associated insufficiency as it crosses mitral valve to the subpulmonary left ventricle (inadeguate profile to estimate subpulmonary ventricular pressure).   Qualitatively good subpulmonary left ventricular systolic function.    No subpulmonary or pulmonary obstruction identified with trivial jet of insufficiency.   No obvious obstruction of baffled pulmonary venous return to tricuspid valve.   Echodensity consistent with Amplatzer device in Mustard baffle (clip 41 and  42).   Mild systemic tricuspid valve insufficiency.  Moderate dilation and hypertrophy of the systemic right ventricle with qualitatively good systolic function.   Mild acceleration across the aortic valve.      Patient Active Problem List    Diagnosis Date Noted    Adult congenital heart disease 12/08/2021    D-TGA (dextro-transposition of great arteries) 01/31/2014    S/P Mustard operation 01/31/2014    Ventricular tachycardia 01/31/2014    Seizures 01/31/2014    CVA (cerebral infarction) 01/31/2014    Sinus node dysfunction 01/31/2014    ICD (implantable cardioverter-defibrillator) in place 01/30/2014          Medications Prior to Admission   Medication Sig Dispense Refill Last Dose    aspirin (ECOTRIN) 325 MG EC tablet Take 325 mg by mouth.   Past Month at Unknown time    multivitamin (THERAGRAN) per tablet Take 1 tablet by mouth once daily.   2/16/2022 at Unknown time    zonisamide (ZONEGRAN) 100 MG Cap Take 200 mg by mouth every evening.   2/16/2022 at 2200    aspirin 81 MG Chew Take 81 mg by mouth once daily.          Review of patient's allergies indicates:  No Known Allergies    Past Medical History:   Diagnosis Date    ADHD (attention deficit hyperactivity disorder)     ADHD (attention deficit hyperactivity disorder)     Asthma     CHF (congestive heart failure)     Encounter for blood transfusion     Hypertension     Migraines     Seizures     Stroke 2004    rigth sided MCA     Past Surgical History:   Procedure Laterality Date    baffle leak  8/04    closed with an 18mm Amplatzer by Dr. Burton at [place]     CARDIAC SURGERY      ICD placement      sick sinus syndrome    ROTATOR CUFF REPAIR      tga repair- mustard  1980    Dr. Johns at [facility]    TONSILLECTOMY      VASCULAR SURGERY       Tobacco Use    Smoking status: Never Smoker    Smokeless tobacco: Never Used   Substance and Sexual Activity    Alcohol use: Yes     Alcohol/week: 1.0 standard drink     Types: 1 Cans of beer  per week     Comment: socially    Drug use: Yes     Types: Marijuana     Comment: about 2 months ago    Sexual activity: Never       Objective:     Vital Signs (Most Recent):  Temp: 36.7 °C (98.1 °F) (02/17/22 0647)  Pulse: (!) 44 (02/17/22 0647)  Resp: 16 (02/17/22 0647)  BP: 135/74 (02/17/22 0648)  SpO2: 98 % (02/17/22 0647) Vital Signs (24h Range):  Temp:  [36.7 °C (98.1 °F)] 36.7 °C (98.1 °F)  Pulse:  [44] 44  Resp:  [16] 16  SpO2:  [98 %] 98 %  BP: (135-140)/(69-74) 135/74     Weight: 86.2 kg (190 lb)  Body mass index is 28.06 kg/m².        Significant Labs:  All pertinent labs from the last 24 hours have been reviewed.    CBC: No results for input(s): WBC, RBC, HGB, HCT, PLT, MCV, MCH, MCHC in the last 72 hours.    CMP: No results for input(s): NA, K, CL, CO2, BUN, CREATININE, GLU, MG, PHOS, CALCIUM, ALBUMIN, PROT, ALKPHOS, ALT, AST, BILITOT in the last 72 hours.    INR  No results for input(s): PT, INR, PROTIME, APTT in the last 72 hours.      Anesthesia Evaluation    I have reviewed the Patient Summary Reports.    I have reviewed the Nursing Notes. I have reviewed the NPO Status.   I have reviewed the Medications.     Review of Systems  Anesthesia Hx:  Denies Family Hx of Anesthesia complications.   Denies Personal Hx of Anesthesia complications.       Physical Exam  General:  Well nourished    Airway/Jaw/Neck:  Airway Findings: Mouth Opening: Normal Tongue: Normal  General Airway Assessment: Adult  Mallampati: II  TM Distance: Normal, at least 6 cm  Jaw/Neck Findings:     Neck ROM: Normal ROM      Dental:  Dental Findings:   Chest/Lungs:  Chest/Lungs Findings: Clear to auscultation, Normal Respiratory Rate     Heart/Vascular:  Heart Findings: Rate: Normal  Rhythm: Regular Rhythm  Sounds: Normal        Mental Status:  Mental Status Findings:  Cooperative, Alert and Oriented         Anesthesia Plan  Type of Anesthesia, risks & benefits discussed:  Anesthesia Type:  general, MAC    Patient's Preference:    Plan Factors:          Intra-op Monitoring Plan: standard ASA monitors  Intra-op Monitoring Plan Comments:   Post Op Pain Control Plan: IV/PO Opioids PRN, per primary service following discharge from PACU and multimodal analgesia  Post Op Pain Control Plan Comments:     Induction:   IV  Beta Blocker:  Patient is not currently on a Beta-Blocker (No further documentation required).       Informed Consent: Patient understands risks and agrees with Anesthesia plan.  Questions answered. Anesthesia consent signed with patient.  ASA Score: 3     Day of Surgery Review of History & Physical:    H&P update referred to the surgeon.         Ready For Surgery From Anesthesia Perspective.

## 2022-02-17 NOTE — PROCEDURE NOTE ADDENDUM
Certification of Assistant at Surgery       Surgery Date: 2/17/2022     Participating Surgeons:  Surgeon(s) and Role:  Panel 1:     * Hernán Hernandez Jr., MD - Primary     * Leann Pompa MD - Assisting  Panel 2:     * Marisabel Martinez MD - Primary    Procedures:  Procedure(s) (LRB):  CATHETERIZATION, HEART, COMBINED RIGHT AND RETROGRADE LEFT, FOR CONGENITAL HEART DEFECT (N/A)  ECHOCARDIOGRAM, TRANSESOPHAGEAL (N/A)  Transesophageal echo (MAGALI) intra-procedure log documentation  Venogram  PTA, Superior Vena Cava    Assistant Surgeon's Certification of Necessity:  I understand that section 1842 (b) (6) (d) of the Social Security Act generally prohibits Medicare Part B reasonable charge payment for the services of assistants at surgery in teaching hospitals when qualified residents are available to furnish such services. I certify that the services for which payment is claimed were medically necessary, and that no qualified resident was available to perform the services. I further understand that these services are subject to post-payment review by the Medicare carrier.      Leann Pompa MD    02/17/2022  10:21 AM

## 2022-02-17 NOTE — Clinical Note
superior vena cava. The angiography was performed via power injection. The injected amount was 30 mL contrast at 20 mL/s. The PSI from the power injection was 900.

## 2022-02-17 NOTE — DISCHARGE SUMMARY
[facility] Short Stay Cardiac Unit  Discharge Note  Short Stay    Procedure(s) (LRB):  CATHETERIZATION, HEART, COMBINED RIGHT AND RETROGRADE LEFT, FOR CONGENITAL HEART DEFECT (N/A)  Transesophageal echo (MAGALI) intra-procedure log documentation  Venogram  PTA, Superior Vena Cava    OUTCOME: Patient tolerated treatment/procedure well without complication and is now ready for discharge.    DISPOSITION: Home or Self Care    FINAL DIAGNOSIS:  S/P Mustard operation    FOLLOWUP: In clinic    DISCHARGE INSTRUCTIONS:    Discharge Procedure Orders   Diet Adult Regular     No dressing needed     Notify your health care provider if you experience any of the following:     Notify your health care provider if you experience any of the following:  increased confusion or weakness     Notify your health care provider if you experience any of the following:  persistent dizziness, light-headedness, or visual disturbances     Notify your health care provider if you experience any of the following:  worsening rash     Notify your health care provider if you experience any of the following:  severe persistent headache     Notify your health care provider if you experience any of the following:  difficulty breathing or increased cough     Notify your health care provider if you experience any of the following:  redness, tenderness, or signs of infection (pain, swelling, redness, odor or green/yellow discharge around incision site)     Notify your health care provider if you experience any of the following:  severe uncontrolled pain     Notify your health care provider if you experience any of the following:  persistent nausea and vomiting or diarrhea     Notify your health care provider if you experience any of the following:  temperature >100.4     Activity as tolerated        TIME SPENT ON DISCHARGE: 30 minutes

## 2022-02-17 NOTE — Clinical Note
The PA catheter was repositioned to the right pulmonary artery. Hemodynamics were performed. O2 saturation was measured at 71%.

## 2022-02-18 LAB
GLUCOSE SERPL-MCNC: 95 MG/DL (ref 70–110)
HCO3 UR-SCNC: 18.6 MMOL/L (ref 24–28)
HCT VFR BLD CALC: 43 %PCV (ref 36–54)
PCO2 BLDA: 32.1 MMHG (ref 35–45)
PH SMN: 7.37 [PH] (ref 7.35–7.45)
PO2 BLDA: 78 MMHG (ref 80–100)
POC ACTIVATED CLOTTING TIME K: 172 SEC (ref 74–137)
POC ACTIVATED CLOTTING TIME K: 225 SEC (ref 74–137)
POC BE: -7 MMOL/L
POC IONIZED CALCIUM: 1.15 MMOL/L (ref 1.06–1.42)
POC SATURATED O2: 95 % (ref 95–100)
POC TCO2: 20 MMOL/L (ref 23–27)
POTASSIUM BLD-SCNC: 3.6 MMOL/L (ref 3.5–5.1)
SAMPLE: ABNORMAL
SODIUM BLD-SCNC: 143 MMOL/L (ref 136–145)

## 2022-04-04 ENCOUNTER — PATIENT MESSAGE (OUTPATIENT)
Dept: CARDIOLOGY | Facility: CLINIC | Age: 42
End: 2022-04-04
Payer: COMMERCIAL

## 2022-04-07 DIAGNOSIS — Q24.9 ADULT CONGENITAL HEART DISEASE: ICD-10-CM

## 2022-04-07 DIAGNOSIS — Q20.3 D-TGA (DEXTRO-TRANSPOSITION OF GREAT ARTERIES): Primary | ICD-10-CM

## 2022-04-07 DIAGNOSIS — I49.5 SINUS NODE DYSFUNCTION: ICD-10-CM

## 2022-04-07 DIAGNOSIS — I47.20 VENTRICULAR TACHYCARDIA: ICD-10-CM

## 2022-04-07 DIAGNOSIS — Z95.810 ICD (IMPLANTABLE CARDIOVERTER-DEFIBRILLATOR) IN PLACE: Primary | ICD-10-CM

## 2022-04-07 DIAGNOSIS — Z98.890 S/P MUSTARD OPERATION: ICD-10-CM

## 2022-06-22 ENCOUNTER — PATIENT MESSAGE (OUTPATIENT)
Dept: CARDIOLOGY | Facility: CLINIC | Age: 42
End: 2022-06-22
Payer: COMMERCIAL

## 2022-07-05 ENCOUNTER — RESEARCH ENCOUNTER (OUTPATIENT)
Dept: RESEARCH | Facility: HOSPITAL | Age: 42
End: 2022-07-05
Payer: COMMERCIAL

## 2022-07-05 NOTE — PROGRESS NOTES
Trial: BEN, 2021.237  PI: Dr. Betts    This note is to indicate that the patient self-enrolled in the CHI-AISHA study (Congenital Heart Initiative - Redefining Outcomes and Navigation to Adult Centered Care). This study is looking to improve care for future adult congenital heart defect patients. The trial consists of surveys periodically that the patient completes independently.     Please contact DARRION Hall or Dr. Surinder Betts for questions.   Trial Number: [id number]  Trial Email: [email or web address]

## 2022-08-25 ENCOUNTER — PATIENT MESSAGE (OUTPATIENT)
Dept: PEDIATRIC CARDIOLOGY | Facility: CLINIC | Age: 42
End: 2022-08-25
Payer: COMMERCIAL

## 2022-09-16 DIAGNOSIS — Z95.810 ICD (IMPLANTABLE CARDIOVERTER-DEFIBRILLATOR) IN PLACE: Primary | ICD-10-CM

## 2022-09-16 DIAGNOSIS — Z98.890 S/P MUSTARD OPERATION: ICD-10-CM

## 2022-09-16 DIAGNOSIS — I49.5 SINUS NODE DYSFUNCTION: ICD-10-CM

## 2022-09-16 DIAGNOSIS — I47.20 VENTRICULAR TACHYCARDIA: ICD-10-CM

## 2022-09-16 DIAGNOSIS — Q20.3 D-TGA (DEXTRO-TRANSPOSITION OF GREAT ARTERIES): ICD-10-CM

## 2022-10-20 ENCOUNTER — OFFICE VISIT (OUTPATIENT)
Dept: CARDIOLOGY | Facility: CLINIC | Age: 42
End: 2022-10-20
Payer: COMMERCIAL

## 2022-10-20 ENCOUNTER — PATIENT MESSAGE (OUTPATIENT)
Dept: PULMONOLOGY | Facility: CLINIC | Age: 42
End: 2022-10-20
Payer: COMMERCIAL

## 2022-10-20 ENCOUNTER — HOSPITAL ENCOUNTER (OUTPATIENT)
Dept: PEDIATRIC CARDIOLOGY | Facility: HOSPITAL | Age: 42
Discharge: HOME | End: 2022-10-20
Attending: PEDIATRICS
Payer: COMMERCIAL

## 2022-10-20 ENCOUNTER — HOSPITAL ENCOUNTER (OUTPATIENT)
Dept: CARDIOLOGY | Facility: HOSPITAL | Age: 42
Discharge: HOME | End: 2022-10-20
Attending: PEDIATRICS
Payer: COMMERCIAL

## 2022-10-20 ENCOUNTER — HOSPITAL ENCOUNTER (OUTPATIENT)
Dept: CARDIOLOGY | Facility: CLINIC | Age: 42
Discharge: HOME | End: 2022-10-20
Payer: COMMERCIAL

## 2022-10-20 ENCOUNTER — CLINICAL SUPPORT (OUTPATIENT)
Dept: CARDIOLOGY | Facility: CLINIC | Age: 42
End: 2022-10-20
Attending: PEDIATRICS
Payer: COMMERCIAL

## 2022-10-20 VITALS
BODY MASS INDEX: 28.14 KG/M2 | SYSTOLIC BLOOD PRESSURE: 121 MMHG | WEIGHT: 190 LBS | WEIGHT: 203.06 LBS | OXYGEN SATURATION: 95 % | BODY MASS INDEX: 30.08 KG/M2 | HEIGHT: 69 IN | HEIGHT: 69 IN | HEART RATE: 46 BPM | DIASTOLIC BLOOD PRESSURE: 71 MMHG

## 2022-10-20 DIAGNOSIS — I47.20 VENTRICULAR TACHYCARDIA: ICD-10-CM

## 2022-10-20 DIAGNOSIS — Q20.3 D-TGA (DEXTRO-TRANSPOSITION OF GREAT ARTERIES): ICD-10-CM

## 2022-10-20 DIAGNOSIS — Z95.0 CARDIAC PACEMAKER IN SITU: ICD-10-CM

## 2022-10-20 DIAGNOSIS — Z98.890 S/P MUSTARD OPERATION: ICD-10-CM

## 2022-10-20 DIAGNOSIS — R06.83 SNORING: ICD-10-CM

## 2022-10-20 DIAGNOSIS — Z87.74 H/O MUSTARD PROCEDURE: ICD-10-CM

## 2022-10-20 DIAGNOSIS — T82.110S FAILURE OF PACEMAKER LEAD, SEQUELA: ICD-10-CM

## 2022-10-20 DIAGNOSIS — I49.5 SINUS NODE DYSFUNCTION: ICD-10-CM

## 2022-10-20 DIAGNOSIS — Q24.9 ADULT CONGENITAL HEART DISEASE: ICD-10-CM

## 2022-10-20 DIAGNOSIS — R53.83 FATIGUE, UNSPECIFIED TYPE: ICD-10-CM

## 2022-10-20 DIAGNOSIS — Q24.9 CHD (CONGENITAL HEART DISEASE): Primary | ICD-10-CM

## 2022-10-20 DIAGNOSIS — Z95.810 ICD (IMPLANTABLE CARDIOVERTER-DEFIBRILLATOR) IN PLACE: ICD-10-CM

## 2022-10-20 DIAGNOSIS — Z98.890 S/P MUSTARD OPERATION: Primary | ICD-10-CM

## 2022-10-20 LAB
BSA FOR ECHO PROCEDURE: 2.05 M2
CV ECHO LV RWT: 0.44 CM
DOP CALC LVOT PEAK VEL: 0.83 M/S
DOP CALCLVOT PEAK VEL VTI: 18.53 CM
E/E' RATIO: 19.8 M/S
ECHO LV POSTERIOR WALL: 0.88 CM (ref 0.6–1.1)
FRACTIONAL SHORTENING: 45 % (ref 28–44)
INTERVENTRICULAR SEPTUM: 0.76 CM (ref 0.6–1.1)
LEFT ATRIUM SIZE: 3.41 CM
LEFT INTERNAL DIMENSION IN SYSTOLE: 2.19 CM (ref 2.1–4)
LEFT VENTRICLE DIASTOLIC VOLUME INDEX: 33.64 ML/M2
LEFT VENTRICLE DIASTOLIC VOLUME: 69.97 ML
LEFT VENTRICLE MASS INDEX: 46 G/M2
LEFT VENTRICLE SYSTOLIC VOLUME INDEX: 7.7 ML/M2
LEFT VENTRICLE SYSTOLIC VOLUME: 15.94 ML
LEFT VENTRICULAR INTERNAL DIMENSION IN DIASTOLE: 4 CM (ref 3.5–6)
LEFT VENTRICULAR MASS: 96.62 G
LV LATERAL E/E' RATIO: 14.14 M/S
LV SEPTAL E/E' RATIO: 33 M/S
MV PEAK E VEL: 0.99 M/S
PV PEAK VELOCITY: 1.55 CM/S
RIGHT VENTRICULAR END-DIASTOLIC DIMENSION: 4.82 CM
TDI LATERAL: 0.07 M/S
TDI SEPTAL: 0.03 M/S
TDI: 0.05 M/S
TRICUSPID ANNULAR PLANE SYSTOLIC EXCURSION: 0.75 CM

## 2022-10-20 PROCEDURE — 93325 DOPPLER ECHO COLOR FLOW MAPG: CPT | Mod: 26,,, | Performed by: PEDIATRICS

## 2022-10-20 PROCEDURE — 93303 ECHO (CUPID ONLY): ICD-10-PCS | Mod: 26,,, | Performed by: PEDIATRICS

## 2022-10-20 PROCEDURE — 93283 PRGRMG EVAL IMPLANTABLE DFB: CPT | Mod: 26,,, | Performed by: PEDIATRICS

## 2022-10-20 PROCEDURE — 93244 CV 3-14 DAY PEDIATRIC HOLTER MONITOR (CUPID ONLY): ICD-10-PCS | Mod: ,,, | Performed by: PEDIATRICS

## 2022-10-20 PROCEDURE — 3074F SYST BP LT 130 MM HG: CPT | Mod: CPTII,S$GLB,, | Performed by: PEDIATRICS

## 2022-10-20 PROCEDURE — 93320 DOPPLER ECHO COMPLETE: CPT | Mod: 26,,, | Performed by: PEDIATRICS

## 2022-10-20 PROCEDURE — 1159F PR MEDICATION LIST DOCUMENTED IN MEDICAL RECORD: ICD-10-PCS | Mod: CPTII,S$GLB,, | Performed by: PEDIATRICS

## 2022-10-20 PROCEDURE — 99215 PR OFFICE/OUTPT VISIT, EST, LEVL V, 40-54 MIN: ICD-10-PCS | Mod: 25,S$GLB,, | Performed by: PEDIATRICS

## 2022-10-20 PROCEDURE — 3008F PR BODY MASS INDEX (BMI) DOCUMENTED: ICD-10-PCS | Mod: CPTII,S$GLB,, | Performed by: PEDIATRICS

## 2022-10-20 PROCEDURE — 99999 PR PBB SHADOW E&M-EST. PATIENT-LVL III: ICD-10-PCS | Mod: PBBFAC,,, | Performed by: PEDIATRICS

## 2022-10-20 PROCEDURE — 3078F DIAST BP <80 MM HG: CPT | Mod: CPTII,S$GLB,, | Performed by: PEDIATRICS

## 2022-10-20 PROCEDURE — 93242 EXT ECG>48HR<7D RECORDING: CPT

## 2022-10-20 PROCEDURE — 99215 OFFICE O/P EST HI 40 MIN: CPT | Mod: 25,S$GLB,, | Performed by: PEDIATRICS

## 2022-10-20 PROCEDURE — 93303 ECHO TRANSTHORACIC: CPT

## 2022-10-20 PROCEDURE — 93000 ELECTROCARDIOGRAM COMPLETE: CPT | Mod: S$GLB,,, | Performed by: PEDIATRICS

## 2022-10-20 PROCEDURE — 93325 ECHO (CUPID ONLY): ICD-10-PCS | Mod: 26,,, | Performed by: PEDIATRICS

## 2022-10-20 PROCEDURE — 1159F MED LIST DOCD IN RCRD: CPT | Mod: CPTII,S$GLB,, | Performed by: PEDIATRICS

## 2022-10-20 PROCEDURE — 93244 EXT ECG>48HR<7D REV&INTERPJ: CPT | Mod: ,,, | Performed by: PEDIATRICS

## 2022-10-20 PROCEDURE — 99999 PR PBB SHADOW E&M-EST. PATIENT-LVL III: CPT | Mod: PBBFAC,,, | Performed by: PEDIATRICS

## 2022-10-20 PROCEDURE — 3074F PR MOST RECENT SYSTOLIC BLOOD PRESSURE < 130 MM HG: ICD-10-PCS | Mod: CPTII,S$GLB,, | Performed by: PEDIATRICS

## 2022-10-20 PROCEDURE — 93000 EKG 12-LEAD: ICD-10-PCS | Mod: S$GLB,,, | Performed by: PEDIATRICS

## 2022-10-20 PROCEDURE — 3078F PR MOST RECENT DIASTOLIC BLOOD PRESSURE < 80 MM HG: ICD-10-PCS | Mod: CPTII,S$GLB,, | Performed by: PEDIATRICS

## 2022-10-20 PROCEDURE — 93283 CV ICD PROGRAMMING PEDIATRICS (CUPID ONLY): ICD-10-PCS | Mod: 26,,, | Performed by: PEDIATRICS

## 2022-10-20 PROCEDURE — 3008F BODY MASS INDEX DOCD: CPT | Mod: CPTII,S$GLB,, | Performed by: PEDIATRICS

## 2022-10-20 PROCEDURE — 93303 ECHO TRANSTHORACIC: CPT | Mod: 26,,, | Performed by: PEDIATRICS

## 2022-10-20 PROCEDURE — 93320 ECHO (CUPID ONLY): ICD-10-PCS | Mod: 26,,, | Performed by: PEDIATRICS

## 2022-10-20 NOTE — PROGRESS NOTES
Name: Yazan Blackwood  MRN: [id number]  : 1980      Subjective:   CC: ACHD, SND, Pacemaker    HPI:    Yazan Blackwood is a 42 y.o. male with hx of D-TGA s/p Mustard with sick sinus syndrome s/p dual-chamber ICD. who presents to [facility] at [facility]. His EP care has been done locally and his device has been followed by Dr. Mcdermott.     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  History of sick sinus syndrome with unexplained syncope   S/P upgrade of a single-chamber AAI pacemaker to a dual-chamber ICD 3/10/2006 with evidence of SVC coil fracture and ICD lead revision 2012,  with change out 2020 for now with low atrial lead impedance.  Followed by Dr. Tolentino in   Seizure disorder - followed by Dr. Riggins, ADHD  Fatigue, shortness of breath.  Possible orthostatic intolerance      Family Hx:  Family History   Problem Relation Age of Onset    No Known Problems Mother     No Known Problems Father     No Known Problems Sister     No Known Problems Brother     No Known Problems Maternal Aunt     No Known Problems Maternal Uncle     No Known Problems Paternal Aunt     No Known Problems Paternal Uncle     No Known Problems Maternal Grandmother     No Known Problems Maternal Grandfather     No Known Problems Paternal Grandmother     No Known Problems Paternal Grandfather     No Known Problems Other     Early death Neg Hx     Congenital heart disease Neg Hx     Anemia Neg Hx     Arrhythmia Neg Hx     Asthma Neg Hx     Clotting  "disorder Neg Hx     Fainting Neg Hx     Heart attack Neg Hx     Heart disease Neg Hx     Heart failure Neg Hx     Hyperlipidemia Neg Hx     Hypertension Neg Hx     Stroke Neg Hx     Atrial Septal Defect Neg Hx        Social Hx:  Lives in [place], [place].    Review of Systems:  GEN:  No fevers, No fatigue, No weight-loss, No weight-gain  EYE:  No significant changes in vision, No eye redness, No lens dislocation  ENT: No cough, No congestion, No swelling, No snoring, No hearing loss,   RESP: No increased work of breathing, No dyspnea, No noisy breathing, No hx of pneumothorax  CV:  No chest pain, No palpitations, No tachycardia, No activity or exercise intolerance  GI:  No abdominal pain, No nausea, No vomiting, No diarrhea, No constipation  ALEXANDRE: Normal UOP  MSK: No pain, No swelling, No joint dislocations, No scoliosis, No extremity swelling  HEME: No easy bruising or bleeding  NEUR: No history of seizures, No dizziness, No near-syncope, No syncope  DERM: No Rashes  PSY: No anxiety, No depression  ALL: See below.    Medications & Allergy:  Current Outpatient Medications on File Prior to Visit   Medication Sig Dispense Refill    aspirin (ECOTRIN) 325 MG EC tablet Take 325 mg by mouth.      multivitamin (THERAGRAN) per tablet Take 1 tablet by mouth once daily.      zonisamide (ZONEGRAN) 100 MG Cap Take 200 mg by mouth every evening.      aspirin 81 MG Chew Take 81 mg by mouth once daily.       No current facility-administered medications on file prior to visit.       Review of patient's allergies indicates:  No Known Allergies       Objective:   Vitals:  Vitals:    10/20/22 0944 10/20/22 0949   BP: 112/73 121/71   BP Location: Right arm Left arm   Patient Position: Sitting Sitting   BP Method: Large (Automatic) Large (Automatic)   Pulse: (!) 46 (!) 46   SpO2: 95%    Weight: 92.1 kg (203 lb 0.7 oz)    Height: 5' 9" (1.753 m)      Body mass index is 29.98 kg/m².  Body surface area is 2.12 meters " squared.    Exam:  GEN: No acute distress, Normal appearing  EYE: Anicteric sclerae  ENT: No drainage, Moist mucous membranes  PULM: Normal work of breathing;  Clear to auscultation bilaterally, Good air movement throughout.  CV: No chest pain;   II/VI systolic murmur;   No rubs or gallops;  EXT: No cyanosis, No edema   2+ radial and dorsalis pedis pulses bilaterally  ABD: Soft, Non-distended, Non-tender,    No hepatomegaly;  Normal bowel sounds  DERM: No rashes  NEUR: Normal gait, Grossly normal tone.  PSY: Normal mood and affect      Results / Data:   ECG:   (10/20/2022) - Ventricular paced rhythm with intermittent sinus beats with AV-conducted vs ectopy  (12/08/2021) - Sinus rhythm with RAD, RBBB    Holter/Zio:   Sinus rhythm predominates.  Single 4-beat episodes of wide-complex tachycardia noted  1.3sec duration  Avg HR 193bpm  Not associated with a patient-triggered event.  Normal HR range.  Patient-triggered event (1) correlates to sinus rhythm.  Occasional isolated PACs (2.3%) with 4 atrial triplets noted over 2-day enrollment.  Occasional isolated PVCs (1.3%).    Echocardiogram:  (10/20/2022)  CONGENITAL CARDIAC HISTORY:  d-transposition of the great vessels  S/P Mustard procedure - [facility] at [facility].  S/P Pacer/AICD - sick sinus syndrome and history of syncope.  S/P 18 mm Amplatzer device for baffle leak following stroke: [facility] - 2004  S/P 6mm -2 in small superior limb baffle leak - 2/25/2014  S/P  Balloon dilation of SVC and several small baffle leaks demonstrated at cath - 2/17/2022.     SEGMENTAL CARDIAC CONNECTIONS:  Abdominal situs solitus.  Atrial baffle present consistent with atrial switch operation.  Discordant systemic and pulmonary venous return to ventricles.  Grossly normal tricuspid and mitral valves noted.  Right ventricle dilation and hypertrophy.  Ventriculoarterial alignment discordant.  Morphology is consistent with d-loop.  Levocardia  The ventricular septum appears intact.      IMPRESION:  Difficult acoustic windows-  Hemodynamic assessment confounded by very irregular rhythm - paced beats interspersed with narrow QRS presumptive sinus beats  Amplatzer device demonstrated in appears to be in stable position.  There are several small poorly defined jets suggesting baffle leaks that could not be clearly localized..  Lead passes mitral valve to subpulmonary left ventricle with no obvious stenosis of the superior limb of the baffle and trivial to mild associated insufficiency as it crosses mitral valve to the subpulmonary left ventricle (inadeguate profile to estimate subpulmonary ventricular pressure).  Qualitatively good subpulmonary left ventricular systolic function.  No subpulmonary or pulmonary obstruction identified with trivial jets of insufficiency.  No obvious obstruction of baffled pulmonary venous return to tricuspid valve.  Echodensity consistent with Amplatzer device in Mustard baffle.  Mild systemic tricuspid valve insufficiency.  Moderate dilation and hypertrophy of the systemic right ventricle with qualitatively good systolic function.  Mild acceleration across the aortic valve.  Trivial jet of aortic insufficiency.    Cath:   (02/17/2022)  1. TGA s/p Mustard procedure.   2. Severe superior Mustard baffle/SVC stenosis with pacing leads in place, improved with angioplasty. Baseline gradient 7 mmHg decreased to 0 mmHg, minimum diameter improved form 6x6 mm to 9x11 mm.  3. Small baffle leaks associated with base of right atrial appendage suture line, improved after SVC dilation.    Device Interrogation:  Permanent Programming   RA Lead: Off V @ ms.   RV Lead: 2.0 Auto V @ 0.4 ms. Sensitivity: 0.3 mV.     Pacemaker Generator and Leads meet standard of FDA approval.     Chamber type: dual.   Pulse: 55   Mode: VVI   Lower limit rate: 55 bpm     Tachy Zone   VF      Rate: >  231 bpm               Therapies: ATP during charging and shock    AT1      Rate: >  171 bpm                Therapies: monitor  Estimated longevity: 9.1 Years .   HV / SVC Impedance: 54  / 69  Ohms    Leads  RV Lead:        P/R-wave: >20  mV       Lead Impedance: 589 Ohms       Paced: 35%   RA Lead:        P/R-wave: 1.2  mV       Impedance: 361 Ohms       Paced: 0%     Thresholds  RV Lead: 0.5 V @ 0.4 ms. Configuration: bipolar.   RA Lead: Off V @ ms.  Reprogramming comments:   NO changes     General comments:   Device interrogation and lead testing performed. Device and leads WNL.  No arrhythmias noted.     Patients device followed by Dr. Mcdermott    Assessment / Plan:   Yazan Blackwood is a 42 y.o. male with hx of D-TGA s/p Mustard with sick sinus syndrome s/p dual-chamber ICD. who presents to [facility] at [facility]. His EP care has been done locally and his device has been followed by Dr. Mcdermott.         Please contact us if he has any questions or concerns.  Our clinic from his [phone] during office hours. For urgent night and weekend concerns, call [phone] and ask for the pediatric cardiologist on call to be paged.

## 2022-10-25 ENCOUNTER — PATIENT MESSAGE (OUTPATIENT)
Dept: PEDIATRIC CARDIOLOGY | Facility: CLINIC | Age: 42
End: 2022-10-25

## 2022-11-02 LAB
HV IMPEDANCE (OHM): 54 OHM
IMPEDANCE RA LEAD (NATIVE): 361 OHMS
IMPEDANCE RA LEAD: 589 OHMS
OHS CV DC PP MS2: 0.4 MS
OHS CV DC PP V1: NORMAL V
OHS CV DC PP V2: NORMAL V
P/R-WAVE RA LEAD (NATIVE): 1.2 MV
P/R-WAVE RA LEAD: 20 MV
SVC IMPEDANCE (OHM): 69 OHM
THRESHOLD MS RA LEAD: 0.4 MS
THRESHOLD V RA LEAD (NATIVE): NORMAL V
THRESHOLD V RA LEAD: 0.5 V

## 2022-11-03 LAB
OHS CV EVENT MONITOR DAY: 1
OHS CV HOLTER HOOKUP DATE: NORMAL
OHS CV HOLTER HOOKUP TIME: NORMAL
OHS CV HOLTER LENGTH DECIMAL HOURS: 46
OHS CV HOLTER LENGTH HOURS: 22
OHS CV HOLTER LENGTH MINUTES: 0
OHS CV HOLTER SCAN DATE: NORMAL
OHS CV HOLTER SINUS AVERAGE HR: 68 BPM
OHS CV HOLTER SINUS MAX HR: 207 BPM
OHS CV HOLTER SINUS MIN HR: 53 BPM
OHS CV HOLTER STUDY END DATE: NORMAL
OHS CV HOLTER STUDY END TIME: NORMAL

## 2022-11-04 ENCOUNTER — TELEPHONE (OUTPATIENT)
Dept: PEDIATRIC CARDIOLOGY | Facility: CLINIC | Age: 42
End: 2022-11-04
Payer: COMMERCIAL

## 2022-11-04 NOTE — TELEPHONE ENCOUNTER
Scheduled virtual follow up to discuss holter results with Dr. Viera on November 10. Pt agreed to date and time.

## 2022-11-10 ENCOUNTER — OFFICE VISIT (OUTPATIENT)
Dept: CARDIOLOGY | Facility: CLINIC | Age: 42
End: 2022-11-10
Payer: COMMERCIAL

## 2022-11-10 ENCOUNTER — TELEPHONE (OUTPATIENT)
Dept: PEDIATRIC CARDIOLOGY | Facility: CLINIC | Age: 42
End: 2022-11-10
Payer: COMMERCIAL

## 2022-11-10 DIAGNOSIS — Z95.810 ICD (IMPLANTABLE CARDIOVERTER-DEFIBRILLATOR) IN PLACE: ICD-10-CM

## 2022-11-10 DIAGNOSIS — I47.20 VENTRICULAR TACHYCARDIA: Primary | ICD-10-CM

## 2022-11-10 DIAGNOSIS — Q20.3 D-TGA (DEXTRO-TRANSPOSITION OF GREAT ARTERIES): ICD-10-CM

## 2022-11-10 DIAGNOSIS — Z98.890 S/P MUSTARD OPERATION: ICD-10-CM

## 2022-11-10 DIAGNOSIS — Z98.890 S/P MUSTARD OPERATION: Primary | ICD-10-CM

## 2022-11-10 PROCEDURE — 99213 PR OFFICE/OUTPT VISIT, EST, LEVL III, 20-29 MIN: ICD-10-PCS | Mod: 95,,, | Performed by: PEDIATRICS

## 2022-11-10 PROCEDURE — 99213 OFFICE O/P EST LOW 20 MIN: CPT | Mod: 95,,, | Performed by: PEDIATRICS

## 2022-11-10 NOTE — PROGRESS NOTES
Name: Yazan Blackwood  MRN: [id number]  : 1980    The patient location is: Work ([place], [place])    Visit type: audiovisual    Face to Face time with patient: 10min  15 minutes of total time spent on the encounter, which includes face to face time and non-face to face time preparing to see the patient (eg, review of tests), Obtaining and/or reviewing separately obtained history, Documenting clinical information in the electronic or other health record, Independently interpreting results (not separately reported) and communicating results to the patient/family/caregiver, or Care coordination (not separately reported).     Each patient to whom he or she provides medical services by telemedicine is:  (1) informed of the relationship between the physician and patient and the respective role of any other health care provider with respect to management of the patient; and (2) notified that he or she may decline to receive medical services by telemedicine and may withdraw from such care at any time.    Subjective:   CC: ACHD, SND, Pacemaker/ICD, D-TGA, Mustard, VT    HPI:    Yazan Blackwood is a 42 y.o. male with hx of D-TGA s/p Mustard with sick sinus syndrome s/p dual-chamber ICD. who presents to [facility] at [facility]. His EP care has been done locally and his device has been followed by Dr. Mcdermott.   The primary reason for this visit is discussed monitor results.  Since I saw him last in clinic, he has overall been feeling well. He states he feels quite optimistic about his health, and is feeling better than he's had in some time. He states that he is slowly increasing his activity level. 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  History of sick sinus syndrome with unexplained syncope   S/P upgrade of a single-chamber AAI pacemaker to a dual-chamber ICD 3/10/2006 with evidence of SVC coil fracture and ICD lead revision 4/11/2012,  with change out 7/24/2020 for now with low atrial lead impedance.  Followed by Dr. Tolentino in 2012  Seizure disorder - followed by Dr. Riggins, ADHD  Fatigue, shortness of breath.  Possible orthostatic intolerance      Family Hx:  Family History   Problem Relation Age of Onset    No Known Problems Mother     No Known Problems Father     No Known Problems Sister     No Known Problems Brother     No Known Problems Maternal Aunt     No Known Problems Maternal Uncle     No Known Problems Paternal Aunt     No Known Problems Paternal Uncle     No Known Problems Maternal Grandmother     No Known Problems Maternal Grandfather     No Known Problems Paternal Grandmother     No Known Problems Paternal Grandfather     No Known Problems Other     Early death Neg Hx     Congenital heart disease Neg Hx     Anemia Neg Hx     Arrhythmia Neg Hx     Asthma Neg Hx     Clotting disorder Neg Hx     Fainting Neg Hx     Heart attack Neg Hx     Heart disease Neg Hx     Heart failure Neg Hx     Hyperlipidemia Neg Hx     Hypertension Neg Hx     Stroke Neg Hx     Atrial Septal Defect Neg Hx        Social Hx:  Lives in [place], [place].    Review of Systems:  GEN:  No fevers, No fatigue, No weight-loss, No weight-gain  EYE:  No significant changes in vision, No eye redness, No lens dislocation  ENT: No cough, No congestion, No swelling, No snoring, No hearing loss,   RESP: No increased work of breathing, No dyspnea, No noisy breathing, No hx of pneumothorax  CV:  No chest pain, No palpitations, No tachycardia, No activity or exercise  intolerance  GI:  No abdominal pain, No nausea, No vomiting, No diarrhea, No constipation  ALEXANDRE: Normal UOP  MSK: No pain, No swelling, No joint dislocations, No scoliosis, No extremity swelling  HEME: No easy bruising or bleeding  NEUR: No history of seizures, No dizziness, No near-syncope, No syncope  DERM: No Rashes  PSY: No anxiety, No depression  ALL: See below.    Medications & Allergy:  Current Outpatient Medications on File Prior to Visit   Medication Sig Dispense Refill    aspirin (ECOTRIN) 325 MG EC tablet Take 325 mg by mouth.      multivitamin (THERAGRAN) per tablet Take 1 tablet by mouth once daily.      zonisamide (ZONEGRAN) 100 MG Cap Take 200 mg by mouth every evening.      [DISCONTINUED] aspirin 81 MG Chew Take 81 mg by mouth once daily.       No current facility-administered medications on file prior to visit.       Review of patient's allergies indicates:  No Known Allergies       Objective:   Vitals:  There were no vitals filed for this visit.    There is no height or weight on file to calculate BMI.  There is no height or weight on file to calculate BSA.    Exam:  (Remote exam as follows)  GEN: No acute distress, Normal appearing  EYE: Anicteric sclerae  ENT: No drainage, Moist mucous membranes  PULM: Normal work of breathing;  CV: No cyanosis   EXT: No apparent edema  ABD: Non-distended  DERM: No visible rashes  NEUR: Grossly normal and age-appropriate movements.  PSY: Normal mood and affect    (Previous exam is as follows):  GEN: No acute distress, Normal appearing  EYE: Anicteric sclerae  ENT: No drainage, Moist mucous membranes  PULM: Normal work of breathing;  Clear to auscultation bilaterally, Good air movement throughout.  CV: No chest pain;   II/VI systolic murmur;   No rubs or gallops;  EXT: No cyanosis, No edema   2+ radial and dorsalis pedis pulses bilaterally  ABD: Soft, Non-distended, Non-tender,    No hepatomegaly;  Normal bowel sounds  DERM: No rashes  NEUR: Normal gait, Grossly  normal tone.  PSY: Normal mood and affect      Results / Data:   ECG:   (10/20/2022) - Ventricular paced rhythm with intermittent sinus beats with AV-conducted vs ectopy  (12/08/2021) - Sinus rhythm with RAD, RBBB    Holter/Zio:   (10/20/2022)  Sinus rhythm predominates.  Single 4-beat episodes of wide-complex tachycardia noted  1.3sec duration  Avg HR 193bpm  Not associated with a patient-triggered event.  Normal HR range.  Patient-triggered event (1) correlates to sinus rhythm.  Occasional isolated PACs (2.3%) with 4 atrial triplets noted over 2-day enrollment.  Occasional isolated PVCs (1.3%).    Echocardiogram:  (10/20/2022)  CONGENITAL CARDIAC HISTORY:  d-transposition of the great vessels  S/P Mustard procedure - [facility] at [facility].  S/P Pacer/AICD - sick sinus syndrome and history of syncope.  S/P 18 mm Amplatzer device for baffle leak following stroke: [facility] - 2004  S/P 6mm -2 in small superior limb baffle leak - 2/25/2014  S/P  Balloon dilation of SVC and several small baffle leaks demonstrated at cath - 2/17/2022.     SEGMENTAL CARDIAC CONNECTIONS:  Abdominal situs solitus.  Atrial baffle present consistent with atrial switch operation.  Discordant systemic and pulmonary venous return to ventricles.  Grossly normal tricuspid and mitral valves noted.  Right ventricle dilation and hypertrophy.  Ventriculoarterial alignment discordant.  Morphology is consistent with d-loop.  Levocardia  The ventricular septum appears intact.     IMPRESION:  Difficult acoustic windows-  Hemodynamic assessment confounded by very irregular rhythm - paced beats interspersed with narrow QRS presumptive sinus beats  Amplatzer device demonstrated in appears to be in stable position.  There are several small poorly defined jets suggesting baffle leaks that could not be clearly localized..  Lead passes mitral valve to subpulmonary left ventricle with no obvious stenosis of the superior limb of the baffle and trivial to mild  associated insufficiency as it crosses mitral valve to the subpulmonary left ventricle (inadeguate profile to estimate subpulmonary ventricular pressure).  Qualitatively good subpulmonary left ventricular systolic function.  No subpulmonary or pulmonary obstruction identified with trivial jets of insufficiency.  No obvious obstruction of baffled pulmonary venous return to tricuspid valve.  Echodensity consistent with Amplatzer device in Mustard baffle.  Mild systemic tricuspid valve insufficiency.  Moderate dilation and hypertrophy of the systemic right ventricle with qualitatively good systolic function.  Mild acceleration across the aortic valve.  Trivial jet of aortic insufficiency.    Cath:   (02/17/2022)  1. TGA s/p Mustard procedure.   2. Severe superior Mustard baffle/SVC stenosis with pacing leads in place, improved with angioplasty. Baseline gradient 7 mmHg decreased to 0 mmHg, minimum diameter improved form 6x6 mm to 9x11 mm.  3. Small baffle leaks associated with base of right atrial appendage suture line, improved after SVC dilation.    Device Interrogation:  Permanent Programming   RA Lead: Off V @ ms.   RV Lead: 2.0 Auto V @ 0.4 ms. Sensitivity: 0.3 mV.     Pacemaker Generator and Leads meet standard of FDA approval.     Chamber type: dual.   Pulse: 55   Mode: VVI   Lower limit rate: 55 bpm     Tachy Zone   VF      Rate: >  231 bpm               Therapies: ATP during charging and shock    AT1      Rate: >  171 bpm               Therapies: monitor  Estimated longevity: 9.1 Years .   HV / SVC Impedance: 54  / 69  Ohms    Leads  RV Lead:        P/R-wave: >20  mV       Lead Impedance: 589 Ohms       Paced: 35%   RA Lead:        P/R-wave: 1.2  mV       Impedance: 361 Ohms       Paced: 0%     Thresholds  RV Lead: 0.5 V @ 0.4 ms. Configuration: bipolar.   RA Lead: Off V @ ms.  Reprogramming comments:   NO changes     General comments:   Device interrogation and lead testing performed. Device and leads  WNL.  No arrhythmias noted.     Patients device followed by Dr. Mcdermott    Assessment / Plan:   Yazan Blackwood is a 42 y.o. male with hx of D-TGA s/p Mustard with sick sinus syndrome s/p dual-chamber ICD. who presents to [facility] at [facility]. His EP care has been done locally and his device has been followed by Dr. Mcdermott.       I reviewed the finding of nonsustained ventricular tachycardia on his ZIO monitor.  We reviewed that he currently has an ICD in place, so that does offer significant protection.  Reviewed possibility of starting a beta blocker, but he wished to remain off of medication for now, which I think is very reasonable.      He is set to see Dr. Betts virtually very soon, and we will try and coordinate our follow-up presumably in about 6 months.      Please contact us if he has any questions or concerns.  Our clinic from his [phone] during office hours. For urgent night and weekend concerns, call [phone] and ask for the pediatric cardiologist on call to be paged.

## 2022-11-10 NOTE — TELEPHONE ENCOUNTER
He was scheduled for a virtual visit today. Since he hadn't checked in. I called to review recent heart rhythm monitor results as well as ensure he hadn't had issues checking in for the virtual visit. Call went to voicemail. Message was left to call our clinic office at his convenience.

## 2022-11-11 NOTE — PATIENT INSTRUCTIONS
Yazan Blackwood is a 42 y.o. male with hx of D-TGA s/p Mustard with sick sinus syndrome s/p dual-chamber ICD. who presents to [facility] at [facility]. His EP care has been done locally and his device has been followed by Dr. Mcdermott.       I reviewed the finding of nonsustained ventricular tachycardia on his ZIO monitor.  We reviewed that he currently has an ICD in place, so that does offer significant protection.  Reviewed possibility of starting a beta blocker, but he wished to remain off of medication for now, which I think is very reasonable.      He is set to see Dr. Betts virtually very soon, and we will try and coordinate our follow-up presumably in about 6 months.      Please contact us if he has any questions or concerns.  Our clinic from his [phone] during office hours. For urgent night and weekend concerns, call [phone] and ask for the pediatric cardiologist on call to be paged.

## 2022-11-15 ENCOUNTER — OFFICE VISIT (OUTPATIENT)
Dept: PEDIATRIC CARDIOLOGY | Facility: CLINIC | Age: 42
End: 2022-11-15
Payer: COMMERCIAL

## 2022-11-15 DIAGNOSIS — E78.5 HYPERLIPIDEMIA, UNSPECIFIED HYPERLIPIDEMIA TYPE: ICD-10-CM

## 2022-11-15 DIAGNOSIS — Q20.3 D-TGA (DEXTRO-TRANSPOSITION OF GREAT ARTERIES): ICD-10-CM

## 2022-11-15 DIAGNOSIS — Q24.9 ADULT CONGENITAL HEART DISEASE: ICD-10-CM

## 2022-11-15 DIAGNOSIS — Z98.890 S/P MUSTARD OPERATION: Primary | ICD-10-CM

## 2022-11-15 PROCEDURE — 99214 PR OFFICE/OUTPT VISIT, EST, LEVL IV, 30-39 MIN: ICD-10-PCS | Mod: 25,95,, | Performed by: PEDIATRICS

## 2022-11-15 PROCEDURE — 99214 OFFICE O/P EST MOD 30 MIN: CPT | Mod: 25,95,, | Performed by: PEDIATRICS

## 2022-11-15 NOTE — PROGRESS NOTES
11/15/2022    Re:Yazan Blackwood  :1980     The patient location is: work  The chief complaint leading to consultation is: congenital heart disease    Visit type: audiovisual    Face to Face time with patient: 10  30 minutes of total time spent on the encounter, which includes face to face time and non-face to face time preparing to see the patient (eg, review of tests), Obtaining and/or reviewing separately obtained history, Documenting clinical information in the electronic or other health record, Independently interpreting results (not separately reported) and communicating results to the patient/family/caregiver, or Care coordination (not separately reported).         Each patient to whom he or she provides medical services by telemedicine is:  (1) informed of the relationship between the physician and patient and the respective role of any other health care provider with respect to management of the patient; and (2) notified that he or she may decline to receive medical services by telemedicine and may withdraw from such care at any time.    Notes:     Bonifacio Wyatt MD  [id number] [id number]    Dr. Michael Crapanzano Dr. Joseph Deumite [facility]    Dear Doctors:    Yazan Blackwood is a 42 y.o. male with:  Diagnoses:  1.  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  History of sick sinus syndrome with  unexplained syncope with upgrade of a single-chamber AAI pacemaker to a dual-chamber ICD 3/10/2006 with evidence of SVC coil fracture and ICD lead revision 4/11/2012, with change out 7/24/2020  - nonsustained VT on holter   3.  Seizure disorder - followed by Dr. Riggins, [facility]  4.  Fatigue, shortness of breath.  Improved after IVC dilation.  5.  Elevated lipids - very different from 2021 labs (but lipids elevated in 2011)  6.  Mildly elevated AST and ALT    My recommendations are as follows:   1. I would recommend endocarditis prophylaxis before dental work.  2. Follow up with me in 6 months with repeat echo, ekg, and repeat labs - CBC (due to polycythemia), CPK and CMP (due to potential need for a statin), lipids  3. Healthy diet, regular low intensity exercise.    Discussion:  Overall, he really looks good on this virtual visit.  To summarize my concerns:  1. He is at risk for systemic right ventricular failure and progressive tricuspid insufficiency.  At present, his RV function is preserved and the tricuspid valve is working well.  If he had hypertension, I would be aggressive treating that, likely using Entresto given potential benefits for the systemic right ventricle.  However, his blood pressure is fine.  2. His SVC baffle obstruction was balloon dilated February 2022 with an excellent result.  However, he is definitely at risk for recurrent obstruction at that level.  If that was symptomatic, he could potentially need a lead extraction, stent placement, and replacement of the leads.  This is a relatively high-risk procedure, and I would certainly rather avoid that if possible.    3. He does have continued baffle leaks that were too small to close at the time of his last catheterization.  He remains polycythemic suggesting that the baffle leak is more significant than obvious based on his saturations of 95% in clinic last time.  We will need to keep an eye on this.  He could need a repeat catheterization.  I  would definitely recommend continuing the aspirin.  I would be more aggressive with a DOAC or Coumadin if he had any thromboembolic events.    4. He is certainly at risk for arrhythmias, atrial and ventricular.  He is being followed by electrophysiology closely.      History of present illness:  This is my 1st time seeing him since his catheterization February 2022 when his severe SVC baffle stenosis was balloon dilated.  Overall, he feels really well.  No syncope or near-syncope.  No palpitations.  No edema at all.  He does get a little out of breath climbing steps, but this is actually improved compared to before his last catheterization.  He is able to play with his son without getting out of breath.  He is able to climb steps.    The review of systems is as noted above. It is otherwise negative for other symptoms related to the general, neurological, psychiatric, endocrine, gastrointestinal, genitourinary, respiratory, dermatologic, musculoskeletal, hematologic, and immunologic systems.      Past Medical History:   Diagnosis Date    ADHD (attention deficit hyperactivity disorder)     ADHD (attention deficit hyperactivity disorder)     Asthma     CHF (congestive heart failure)     Encounter for blood transfusion     Hypertension     Migraines     Seizures     Stroke 2004    rigth sided MCA     Past Surgical History:   Procedure Laterality Date    baffle leak  8/04    closed with an 18mm Amplatzer by Dr. Burton at [place]     CARDIAC SURGERY      COMBINED RIGHT AND RETROGRADE LEFT HEART CATHETERIZATION FOR CONGENITAL HEART DEFECT N/A 2/17/2022    Procedure: CATHETERIZATION, HEART, COMBINED RIGHT AND RETROGRADE LEFT, FOR CONGENITAL HEART DEFECT;  Surgeon: Hernán Hernandez Jr., MD;  Location: [facility] CATH LAB;  Service: Cardiology;  Laterality: N/A;  Pedi Heart    ICD placement      sick sinus syndrome    PHLEBOGRAPHY  2/17/2022    Procedure: Venogram;  Surgeon: Hernán Hernandez Jr., MD;  Location: [facility] CATH LAB;  Service:  Cardiology;;    ROTATOR CUFF REPAIR      tga repair- mustard  1980    Dr. Johns at [facility]    TONSILLECTOMY      VASCULAR SURGERY       Family History   Problem Relation Age of Onset    No Known Problems Mother     No Known Problems Father     No Known Problems Sister     No Known Problems Brother     No Known Problems Maternal Aunt     No Known Problems Maternal Uncle     No Known Problems Paternal Aunt     No Known Problems Paternal Uncle     No Known Problems Maternal Grandmother     No Known Problems Maternal Grandfather     No Known Problems Paternal Grandmother     No Known Problems Paternal Grandfather     No Known Problems Other     Early death Neg Hx     Congenital heart disease Neg Hx     Anemia Neg Hx     Arrhythmia Neg Hx     Asthma Neg Hx     Clotting disorder Neg Hx     Fainting Neg Hx     Heart attack Neg Hx     Heart disease Neg Hx     Heart failure Neg Hx     Hyperlipidemia Neg Hx     Hypertension Neg Hx     Stroke Neg Hx     Atrial Septal Defect Neg Hx      Social History     Socioeconomic History    Marital status: Single   Tobacco Use    Smoking status: Never    Smokeless tobacco: Never   Substance and Sexual Activity    Alcohol use: Yes     Alcohol/week: 1.0 standard drink     Types: 1 Cans of beer per week     Comment: socially    Drug use: Yes     Types: Marijuana     Comment: about 2 months ago    Sexual activity: Never   Social History Narrative    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     Current Outpatient Medications on File Prior to Visit   Medication Sig Dispense Refill    aspirin (ECOTRIN) 325 MG EC tablet Take 325 mg by mouth.      multivitamin (THERAGRAN) per tablet Take 1 tablet by mouth once daily.      zonisamide (ZONEGRAN) 100 MG Cap Take 200 mg by mouth every evening.       No current facility-administered medications on file prior to visit.     Review of patient's allergies indicates:  No Known Allergies   There were no vitals filed for this visit.  Most recent vitals:  Vitals - 1  value per visit 10/20/2022 10/20/2022   SYSTOLIC 112 121   DIASTOLIC 73 71   Pulse 46 46   Temp     Resp     SPO2 95    Weight (lb) 203.04    Weight (kg) 92.1    Height 69    BMI (Calculated) 30      In general, he is a very healthy-appearing nondysmorphic male in no apparent distress.      No tests in clinic today.  EKG 10/20/22  Ventricular-paced rhythm intermittent sinus beats with AV conduction vs   ectopy     Holter 10/20/22:  Sinus rhythm predominates.  Single 4-beat episodes of wide-complex tachycardia noted  1.3sec duration  Avg HR 193bpm  Not associated with a patient-triggered event.  Normal HR range.  Patient-triggered event (1) correlates to sinus rhythm.  Occasional isolated PACs (2.3%) with 4 atrial triplets noted over 2-day enrollment.  Occasional isolated PVCs (1.3%).    Results for orders placed during the hospital encounter of 10/20/22    Echo    Interpretation Summary  CONGENITAL CARDIAC HISTORY:  d-transposition of the great vessels  S/P Mustard procedure - Johns at [facility].  S/P Pacer/AICD - sick sinus syndrome and history of syncope.  S/P 18 mm Amplatzer device for baffle leak following stroke: [facility]'s - 2004  S/P 6mm -2 in small superior limb baffle leak - 2/25/2014  S/P  Balloon dilation of SVC and several small baffle leaks demonstrated at cath - 2/17/2022.    SEGMENTAL CARDIAC CONNECTIONS:  Abdominal situs solitus.  Atrial baffle present consistent with atrial switch operation.  Discordant systemic and pulmonary venous return to ventricles.  Grossly normal tricuspid and mitral valves noted.  Right ventricle dilation and hypertrophy.  Ventriculoarterial alignment discordant.  Morphology is consistent with d-loop.  Levocardia  The ventricular septum appears intact.      IMPRESION:  Difficult acoustic windows-  Hemodynamic assessment confounded by very irregular rhythm - paced beats interspersed with narrow QRS presumptive sinus beats  Amplatzer device demonstrated in appears to be  in stable position.  There are several small poorly defined jets suggesting baffle leaks that could not be clearly localized..  Lead passes mitral valve to subpulmonary left ventricle with no obvious stenosis of the superior limb of the baffle and trivial to mild associated insufficiency as it crosses mitral valve to the subpulmonary left ventricle (inadeguate profile to estimate subpulmonary ventricular pressure).  Qualitatively good subpulmonary left ventricular systolic function.  No subpulmonary or pulmonary obstruction identified with trivial jets of insufficiency.  No obvious obstruction of baffled pulmonary venous return to tricuspid valve.  Echodensity consistent with Amplatzer device in Mustard baffle.  Mild systemic tricuspid valve insufficiency.  Moderate dilation and hypertrophy of the systemic right ventricle with qualitatively good systolic function.  Mild acceleration across the aortic valve.  Trivial jet of aortic insufficiency.    Cath 2/17/22:  IMPRESSION:  1. TGA s/p Mustard procedure.   2. Severe superior Mustard baffle/SVC stenosis with pacing leads in place, improved with angioplasty. Baseline gradient 7 mmHg decreased to 0 mmHg, minimum diameter improved form 6x6 mm to 9x11 mm.  3. Small baffle leaks associated with base of right atrial appendage suture line, improved after SVC dilation.      Thank you so much for referring this very interesting patient back to our clinic.      Sincerely,        Surinder Betts MD  Pediatric Cardiology  Adult Congenital Heart Disease  Pediatric Heart Failure and Transplantation  [facility] [address] [place]  [address]  [phone]

## 2022-11-28 ENCOUNTER — TELEPHONE (OUTPATIENT)
Dept: PULMONOLOGY | Facility: CLINIC | Age: 42
End: 2022-11-28
Payer: COMMERCIAL

## 2022-11-28 DIAGNOSIS — G47.30 SLEEP-DISORDERED BREATHING: Primary | ICD-10-CM

## (undated) DEVICE — CATH INFINITI JUDKINS JR4

## (undated) DEVICE — GUIDEWIRE STF .035X180CM ANG

## (undated) DEVICE — SHEATH INTRODUCER 7FR 11CM

## (undated) DEVICE — PACK PEDIATRIC ANGIOGRAPHY

## (undated) DEVICE — Device

## (undated) DEVICE — SHEATH INTRODUCER 4FR 11CM

## (undated) DEVICE — KIT CUSTOM MANIFOLD

## (undated) DEVICE — KIT SHEATH 9FRX11CM

## (undated) DEVICE — BLLN CATH VIDA 100X20X2

## (undated) DEVICE — STOPCOCK 3-WAY

## (undated) DEVICE — SPIKE CONTRAST CONTROLLER

## (undated) DEVICE — GUIDEWIRE EMERALD 150CM PTFE

## (undated) DEVICE — KIT MICROINTRO 4F .018X40X7CM

## (undated) DEVICE — GUIDEWIRE AMPLATZ .035X260 SS

## (undated) DEVICE — PROTECTION STATION PLUS

## (undated) DEVICE — COVER BAND BAG 40 X 40

## (undated) DEVICE — CATH MULTI-TRACK 5FR

## (undated) DEVICE — CATH ANG PIGTAIL 4FR INFINITY

## (undated) DEVICE — VISE RADIFOCUS MULTI TORQUE

## (undated) DEVICE — INFLATOR ENCORE 26 BLLN INFL

## (undated) DEVICE — WIRE GUIDE SAFE-T-J .035 260CM

## (undated) DEVICE — LINE 60IN PRESSURE MON.

## (undated) DEVICE — SYR MARK 7 ARTERION 150ML

## (undated) DEVICE — OMNIPAQUE 350 200ML

## (undated) DEVICE — BLLN CATH VIDA 100X14X2

## (undated) DEVICE — CATH WEDGE 7FRX110CM

## (undated) DEVICE — COVER BAND BAG 28 X 12